# Patient Record
Sex: FEMALE | Race: WHITE | Employment: OTHER | URBAN - METROPOLITAN AREA
[De-identification: names, ages, dates, MRNs, and addresses within clinical notes are randomized per-mention and may not be internally consistent; named-entity substitution may affect disease eponyms.]

---

## 2017-01-23 ENCOUNTER — TELEPHONE (OUTPATIENT)
Dept: FAMILY MEDICINE CLINIC | Age: 77
End: 2017-01-23

## 2017-03-01 ENCOUNTER — TELEPHONE (OUTPATIENT)
Dept: FAMILY MEDICINE CLINIC | Age: 77
End: 2017-03-01

## 2017-03-10 ENCOUNTER — TELEPHONE (OUTPATIENT)
Dept: FAMILY MEDICINE CLINIC | Age: 77
End: 2017-03-10

## 2017-03-10 ENCOUNTER — OFFICE VISIT (OUTPATIENT)
Dept: FAMILY MEDICINE CLINIC | Age: 77
End: 2017-03-10

## 2017-03-10 VITALS
HEART RATE: 56 BPM | WEIGHT: 194 LBS | HEIGHT: 67 IN | SYSTOLIC BLOOD PRESSURE: 170 MMHG | DIASTOLIC BLOOD PRESSURE: 64 MMHG | BODY MASS INDEX: 30.45 KG/M2

## 2017-03-10 DIAGNOSIS — Z01.818 PRE-OP EXAM: ICD-10-CM

## 2017-03-10 DIAGNOSIS — R07.9 CHEST PAIN, UNSPECIFIED TYPE: ICD-10-CM

## 2017-03-10 DIAGNOSIS — E78.2 MIXED HYPERLIPIDEMIA: ICD-10-CM

## 2017-03-10 DIAGNOSIS — M48.061 SPINAL STENOSIS OF LUMBAR REGION: Primary | ICD-10-CM

## 2017-03-10 DIAGNOSIS — I10 ESSENTIAL HYPERTENSION, BENIGN: ICD-10-CM

## 2017-03-10 LAB
A/G RATIO: 1.6 (ref 1.1–2.2)
ALBUMIN SERPL-MCNC: 4.2 G/DL (ref 3.4–5)
ALP BLD-CCNC: 89 U/L (ref 40–129)
ALT SERPL-CCNC: 13 U/L (ref 10–40)
ANION GAP SERPL CALCULATED.3IONS-SCNC: 14 MMOL/L (ref 3–16)
AST SERPL-CCNC: 16 U/L (ref 15–37)
BASOPHILS ABSOLUTE: 0.1 K/UL (ref 0–0.2)
BASOPHILS RELATIVE PERCENT: 2.3 %
BILIRUB SERPL-MCNC: 0.3 MG/DL (ref 0–1)
BUN BLDV-MCNC: 16 MG/DL (ref 7–20)
CALCIUM SERPL-MCNC: 9.3 MG/DL (ref 8.3–10.6)
CHLORIDE BLD-SCNC: 100 MMOL/L (ref 99–110)
CHOLESTEROL, TOTAL: 211 MG/DL (ref 0–199)
CO2: 25 MMOL/L (ref 21–32)
CREAT SERPL-MCNC: 0.8 MG/DL (ref 0.6–1.2)
EOSINOPHILS ABSOLUTE: 0.2 K/UL (ref 0–0.6)
EOSINOPHILS RELATIVE PERCENT: 3.3 %
GFR AFRICAN AMERICAN: >60
GFR NON-AFRICAN AMERICAN: >60
GLOBULIN: 2.7 G/DL
GLUCOSE BLD-MCNC: 122 MG/DL (ref 70–99)
HCT VFR BLD CALC: 38.8 % (ref 36–48)
HDLC SERPL-MCNC: 51 MG/DL (ref 40–60)
HEMOGLOBIN: 12.8 G/DL (ref 12–16)
LDL CHOLESTEROL CALCULATED: 126 MG/DL
LYMPHOCYTES ABSOLUTE: 2 K/UL (ref 1–5.1)
LYMPHOCYTES RELATIVE PERCENT: 40.3 %
MCH RBC QN AUTO: 29.9 PG (ref 26–34)
MCHC RBC AUTO-ENTMCNC: 33 G/DL (ref 31–36)
MCV RBC AUTO: 90.5 FL (ref 80–100)
MONOCYTES ABSOLUTE: 0.4 K/UL (ref 0–1.3)
MONOCYTES RELATIVE PERCENT: 8.7 %
NEUTROPHILS ABSOLUTE: 2.2 K/UL (ref 1.7–7.7)
NEUTROPHILS RELATIVE PERCENT: 45.4 %
PDW BLD-RTO: 13.8 % (ref 12.4–15.4)
PLATELET # BLD: 290 K/UL (ref 135–450)
PMV BLD AUTO: 8.3 FL (ref 5–10.5)
POTASSIUM SERPL-SCNC: 4.6 MMOL/L (ref 3.5–5.1)
RBC # BLD: 4.29 M/UL (ref 4–5.2)
SODIUM BLD-SCNC: 139 MMOL/L (ref 136–145)
TOTAL PROTEIN: 6.9 G/DL (ref 6.4–8.2)
TRIGL SERPL-MCNC: 169 MG/DL (ref 0–150)
VLDLC SERPL CALC-MCNC: 34 MG/DL
WBC # BLD: 4.9 K/UL (ref 4–11)

## 2017-03-10 PROCEDURE — 99215 OFFICE O/P EST HI 40 MIN: CPT | Performed by: FAMILY MEDICINE

## 2017-03-10 PROCEDURE — 80053 COMPREHEN METABOLIC PANEL: CPT | Performed by: FAMILY MEDICINE

## 2017-03-10 PROCEDURE — 85025 COMPLETE CBC W/AUTO DIFF WBC: CPT | Performed by: FAMILY MEDICINE

## 2017-03-10 PROCEDURE — 93000 ELECTROCARDIOGRAM COMPLETE: CPT | Performed by: FAMILY MEDICINE

## 2017-03-10 PROCEDURE — 80061 LIPID PANEL: CPT | Performed by: FAMILY MEDICINE

## 2017-04-06 DIAGNOSIS — I10 ESSENTIAL HYPERTENSION, BENIGN: ICD-10-CM

## 2017-04-07 RX ORDER — METOPROLOL SUCCINATE 50 MG/1
TABLET, EXTENDED RELEASE ORAL
Qty: 90 TABLET | Refills: 3 | Status: SHIPPED | OUTPATIENT
Start: 2017-04-07 | End: 2018-04-23 | Stop reason: SDUPTHER

## 2017-04-07 RX ORDER — AMLODIPINE BESYLATE 5 MG/1
TABLET ORAL
Qty: 90 TABLET | Refills: 3 | Status: SHIPPED | OUTPATIENT
Start: 2017-04-07 | End: 2018-04-23 | Stop reason: SDUPTHER

## 2018-04-17 ENCOUNTER — OFFICE VISIT (OUTPATIENT)
Dept: FAMILY MEDICINE CLINIC | Age: 78
End: 2018-04-17

## 2018-04-17 VITALS
RESPIRATION RATE: 16 BRPM | WEIGHT: 198 LBS | HEIGHT: 67 IN | HEART RATE: 65 BPM | SYSTOLIC BLOOD PRESSURE: 150 MMHG | DIASTOLIC BLOOD PRESSURE: 76 MMHG | OXYGEN SATURATION: 94 % | BODY MASS INDEX: 31.08 KG/M2

## 2018-04-17 DIAGNOSIS — M85.80 OSTEOPENIA, UNSPECIFIED LOCATION: ICD-10-CM
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
## 2018-04-17 DIAGNOSIS — M54.6 THORACIC SPINE PAIN: Primary | ICD-10-CM

## 2018-04-17 PROCEDURE — 99213 OFFICE O/P EST LOW 20 MIN: CPT | Performed by: INTERNAL MEDICINE

## 2018-04-17 ASSESSMENT — PATIENT HEALTH QUESTIONNAIRE - PHQ9
SUM OF ALL RESPONSES TO PHQ9 QUESTIONS 1 & 2: 0
1. LITTLE INTEREST OR PLEASURE IN DOING THINGS: 0
SUM OF ALL RESPONSES TO PHQ QUESTIONS 1-9: 0
2. FEELING DOWN, DEPRESSED OR HOPELESS: 0

## 2018-04-23 ENCOUNTER — HOSPITAL ENCOUNTER (OUTPATIENT)
Dept: MAMMOGRAPHY | Age: 78
Discharge: HOME | End: 2018-04-23
Attending: FAMILY MEDICINE | Admitting: FAMILY MEDICINE

## 2018-04-23 DIAGNOSIS — I10 ESSENTIAL HYPERTENSION, BENIGN: ICD-10-CM

## 2018-04-23 DIAGNOSIS — Z12.31 VISIT FOR SCREENING MAMMOGRAM: ICD-10-CM

## 2018-04-24 ENCOUNTER — TELEPHONE (OUTPATIENT)
Dept: FAMILY MEDICINE CLINIC | Age: 78
End: 2018-04-24

## 2018-04-24 DIAGNOSIS — R92.8 ABNORMAL MAMMOGRAM: Primary | ICD-10-CM

## 2018-04-24 RX ORDER — AMLODIPINE BESYLATE 5 MG/1
TABLET ORAL
Qty: 90 TABLET | Refills: 3 | Status: SHIPPED | OUTPATIENT
Start: 2018-04-24 | End: 2019-08-09 | Stop reason: SDUPTHER

## 2018-04-24 RX ORDER — METOPROLOL SUCCINATE 50 MG/1
TABLET, EXTENDED RELEASE ORAL
Qty: 90 TABLET | Refills: 3 | Status: SHIPPED | OUTPATIENT
Start: 2018-04-24 | End: 2019-08-09 | Stop reason: SDUPTHER

## 2018-04-25 ENCOUNTER — HOSPITAL ENCOUNTER (OUTPATIENT)
Dept: OTHER | Age: 78
Discharge: HOME | End: 2018-04-30
Attending: INTERNAL MEDICINE | Admitting: INTERNAL MEDICINE

## 2018-04-25 ASSESSMENT — PAIN DESCRIPTION - LOCATION: LOCATION: BACK

## 2018-04-25 ASSESSMENT — PAIN DESCRIPTION - PAIN TYPE: TYPE: ACUTE PAIN

## 2018-04-25 ASSESSMENT — PAIN DESCRIPTION - ORIENTATION: ORIENTATION: RIGHT

## 2018-04-25 ASSESSMENT — PAIN DESCRIPTION - PROGRESSION: CLINICAL_PROGRESSION: GRADUALLY IMPROVING

## 2018-04-25 ASSESSMENT — PAIN SCALES - GENERAL: PAINLEVEL_OUTOF10: 10

## 2018-04-25 ASSESSMENT — PAIN DESCRIPTION - FREQUENCY: FREQUENCY: CONTINUOUS

## 2018-04-25 ASSESSMENT — PAIN DESCRIPTION - ONSET: ONSET: AWAKENED FROM SLEEP

## 2018-04-25 ASSESSMENT — PAIN DESCRIPTION - DESCRIPTORS: DESCRIPTORS: CONSTANT

## 2018-04-26 ENCOUNTER — TELEPHONE (OUTPATIENT)
Dept: FAMILY MEDICINE CLINIC | Age: 78
End: 2018-04-26

## 2018-04-27 ENCOUNTER — HOSPITAL ENCOUNTER (OUTPATIENT)
Dept: ULTRASOUND IMAGING | Age: 78
Discharge: HOME | End: 2018-04-27
Attending: FAMILY MEDICINE | Admitting: FAMILY MEDICINE

## 2018-04-27 DIAGNOSIS — R92.8 OTHER ABNORMAL AND INCONCLUSIVE FINDINGS ON DIAGNOSTIC IMAGING OF BREAST: ICD-10-CM

## 2018-04-27 DIAGNOSIS — R92.8 ABNORMAL MAMMOGRAM: ICD-10-CM

## 2018-05-01 ENCOUNTER — HOSPITAL ENCOUNTER (OUTPATIENT)
Dept: OTHER | Age: 78
Discharge: HOME | End: 2018-05-31
Attending: INTERNAL MEDICINE | Admitting: INTERNAL MEDICINE

## 2019-01-04 ENCOUNTER — TELEPHONE (OUTPATIENT)
Dept: FAMILY MEDICINE CLINIC | Age: 79
End: 2019-01-04

## 2019-04-02 PROBLEM — R73.03 PREDIABETES: Status: ACTIVE | Noted: 2019-04-02

## 2019-04-03 ENCOUNTER — OFFICE VISIT (OUTPATIENT)
Dept: FAMILY MEDICINE CLINIC | Age: 79
End: 2019-04-03
Payer: COMMERCIAL

## 2019-04-03 VITALS
HEART RATE: 62 BPM | OXYGEN SATURATION: 95 % | WEIGHT: 190 LBS | SYSTOLIC BLOOD PRESSURE: 147 MMHG | BODY MASS INDEX: 29.82 KG/M2 | RESPIRATION RATE: 16 BRPM | HEIGHT: 67 IN | DIASTOLIC BLOOD PRESSURE: 80 MMHG

## 2019-04-03 DIAGNOSIS — N60.09 CYST OF BREAST, UNSPECIFIED LATERALITY: ICD-10-CM

## 2019-04-03 DIAGNOSIS — M85.80 OSTEOPENIA, UNSPECIFIED LOCATION: ICD-10-CM

## 2019-04-03 DIAGNOSIS — N39.46 MIXED STRESS AND URGE URINARY INCONTINENCE: ICD-10-CM

## 2019-04-03 DIAGNOSIS — M19.91 PRIMARY OSTEOARTHRITIS, UNSPECIFIED SITE: ICD-10-CM

## 2019-04-03 DIAGNOSIS — K21.9 GASTROESOPHAGEAL REFLUX DISEASE WITHOUT ESOPHAGITIS: ICD-10-CM

## 2019-04-03 DIAGNOSIS — Z87.01 HISTORY OF PNEUMONIA: ICD-10-CM

## 2019-04-03 DIAGNOSIS — R73.03 PREDIABETES: ICD-10-CM

## 2019-04-03 DIAGNOSIS — I10 ESSENTIAL HYPERTENSION, BENIGN: Primary | ICD-10-CM

## 2019-04-03 DIAGNOSIS — M48.061 SPINAL STENOSIS OF LUMBAR REGION, UNSPECIFIED WHETHER NEUROGENIC CLAUDICATION PRESENT: ICD-10-CM

## 2019-04-03 DIAGNOSIS — E78.2 MIXED HYPERLIPIDEMIA: ICD-10-CM

## 2019-04-03 LAB
ALBUMIN SERPL-MCNC: 4.6 G/DL (ref 3.4–5)
ALP BLD-CCNC: 94 U/L (ref 40–129)
ALT SERPL-CCNC: 10 U/L (ref 10–40)
ANION GAP SERPL CALCULATED.3IONS-SCNC: 12 MMOL/L (ref 3–16)
AST SERPL-CCNC: 14 U/L (ref 15–37)
BILIRUB SERPL-MCNC: <0.2 MG/DL (ref 0–1)
BILIRUBIN DIRECT: <0.2 MG/DL (ref 0–0.3)
BILIRUBIN URINE: NEGATIVE
BILIRUBIN, INDIRECT: ABNORMAL MG/DL (ref 0–1)
BLOOD, URINE: NEGATIVE
BUN BLDV-MCNC: 24 MG/DL (ref 7–20)
CALCIUM SERPL-MCNC: 9.7 MG/DL (ref 8.3–10.6)
CHLORIDE BLD-SCNC: 104 MMOL/L (ref 99–110)
CHOLESTEROL, TOTAL: 227 MG/DL (ref 0–199)
CLARITY: CLEAR
CO2: 26 MMOL/L (ref 21–32)
COLOR: YELLOW
CREAT SERPL-MCNC: 0.7 MG/DL (ref 0.6–1.2)
GFR AFRICAN AMERICAN: >60
GFR NON-AFRICAN AMERICAN: >60
GLUCOSE BLD-MCNC: 98 MG/DL (ref 70–99)
GLUCOSE URINE: NEGATIVE MG/DL
HDLC SERPL-MCNC: 49 MG/DL (ref 40–60)
KETONES, URINE: NEGATIVE MG/DL
LDL CHOLESTEROL CALCULATED: 121 MG/DL
LEUKOCYTE ESTERASE, URINE: NEGATIVE
MICROSCOPIC EXAMINATION: NORMAL
NITRITE, URINE: NEGATIVE
PH UA: 5.5 (ref 5–8)
POTASSIUM SERPL-SCNC: 4.9 MMOL/L (ref 3.5–5.1)
PROTEIN UA: NEGATIVE MG/DL
SODIUM BLD-SCNC: 142 MMOL/L (ref 136–145)
SPECIFIC GRAVITY UA: 1.02 (ref 1–1.03)
TOTAL PROTEIN: 7.4 G/DL (ref 6.4–8.2)
TRIGL SERPL-MCNC: 287 MG/DL (ref 0–150)
URINE REFLEX TO CULTURE: NORMAL
URINE TYPE: NORMAL
UROBILINOGEN, URINE: 0.2 E.U./DL
VITAMIN D 25-HYDROXY: 27.4 NG/ML
VLDLC SERPL CALC-MCNC: 57 MG/DL

## 2019-04-03 PROCEDURE — 99214 OFFICE O/P EST MOD 30 MIN: CPT | Performed by: INTERNAL MEDICINE

## 2019-04-03 PROCEDURE — 81003 URINALYSIS AUTO W/O SCOPE: CPT | Performed by: INTERNAL MEDICINE

## 2019-04-03 ASSESSMENT — PATIENT HEALTH QUESTIONNAIRE - PHQ9
2. FEELING DOWN, DEPRESSED OR HOPELESS: 0
SUM OF ALL RESPONSES TO PHQ9 QUESTIONS 1 & 2: 0
SUM OF ALL RESPONSES TO PHQ QUESTIONS 1-9: 0
1. LITTLE INTEREST OR PLEASURE IN DOING THINGS: 0
SUM OF ALL RESPONSES TO PHQ QUESTIONS 1-9: 0

## 2019-04-03 NOTE — PROGRESS NOTES
HPI: Mauro Velasco presents to establish care. Chronic health issues include obesity, mixed urinary incontinence, osteoarthritis, lumbar laminectomy, hypertension, prediabetes, hyperlipidemia. History mixed urinary incontinence. Wet constantly. Wears a pad. States this occurred after having back surgery. Kegals have not been helpful. Is losing weight. Exercising. No injury since. No stool leakage. Positive prediabetes. No recurrent urinary tract infections. BMI 29 rare GE reflux. Positive prediabetes. Elevated cholesterol. Urinary incontinence. Has lost weight with exercise and diet. Continue to do so. Lumbar laminectomy with lower extremity paresthesias and spinal stimulator. Uses no pain medication. Other joint issues persist. Uses no medications. This seems to be stable. Hypertension. Normal thallium ejection fraction 65% . Mildly elevated LDL. Positive prediabetes secondary smoke. No chest pain with exertion or exercise. occ palpitations       , no FH ovarian, breast. No abnormal Pap smears amenorrheic. Urinary incontinence. PMH    Total hip replacement x 2\     Lumbar laminectomy         pool 60 min twice weekly.  poor health. No tobacco. 3-4 yearly. Son   from fall at [facility]. 2 grandchildren     FH: +heart failure, prostate cancer, heart disease, OA          - ovarin, breast cancer, mental illness    Review of systems: Up-to-date on eye exams. Delinquent dental exams. Follow greater than a year ago. No recurrence. Occasional wheeze. No breathing issues today. Denies any chest pain palpitations or increased lower extremity edema. Rare GE reflux. No bloody stools or change in stools. Told she needs no more colonoscopies. Urinary incontinence. No vaginal complaints. Contact dermatitis perineum. Osteoarthritis.  Skin cancers with routine follow-up with dermatology    Constitutional, ent, CV, respiratory, GI, , joint, skin, allergic and psychiatric ROS Physical Exam     NAD alert and cooperative  HEENT: Fair dentition. No proptosis. Pink conjunctiva. Good upstroke of the carotids. Lungs are clear. Mild kyphosis. No wheezes rales or rhonchi. Good inspiration. Cardiovascular exam regular rate and rhythm without any murmur click. No  dominant masses. Nodularity on right. Positive obesity no hepatosplenomegaly or epigastric tenderness. Good range of motion to hips. Crepitus of the knees. Trace lower extremity edema varicosities. Crepitus of the knees. Multiple hyperpigmented nevi. None suspicious borders.       Chemistry        Component Value Date/Time     03/10/2017 1055    K 4.6 03/10/2017 1055     03/10/2017 1055    CO2 25 03/10/2017 1055    BUN 16 03/10/2017 1055    CREATININE 0.8 03/10/2017 1055        Component Value Date/Time    CALCIUM 9.3 03/10/2017 1055    ALKPHOS 89 03/10/2017 1055    AST 16 03/10/2017 1055    ALT 13 03/10/2017 1055    BILITOT 0.3 03/10/2017 1055    BILITOT 0.5 11/16/2011            Lab Results   Component Value Date    WBC 4.9 03/10/2017    HGB 12.8 03/10/2017    HCT 38.8 03/10/2017    MCV 90.5 03/10/2017     03/10/2017     Lab Results   Component Value Date    LABA1C 6.0 04/05/2012     Lab Results   Component Value Date    .5 04/05/2012     Lab Results   Component Value Date    LABA1C 6.0 04/05/2012     No components found for: CHLPL  Lab Results   Component Value Date    TRIG 169 (H) 03/10/2017    TRIG 233 (H) 09/16/2016    TRIG 271 (H) 08/11/2015     Lab Results   Component Value Date    HDL 51 03/10/2017    HDL 51 09/16/2016    HDL 47 08/11/2015     Lab Results   Component Value Date    LDLCALC 126 (H) 03/10/2017    LDLCALC 140 (H) 09/16/2016    LDLCALC 116 (H) 08/11/2015     Lab Results   Component Value Date    LABVLDL 34 03/10/2017    LABVLDL 47 09/16/2016    LABVLDL 54 08/11/2015       Old labs and records reviewed or requested  Discussed past lab and studies with patient      Diagnosis Orders 1. Essential hypertension, benign  Basic Metabolic Panel   2. Spinal stenosis of lumbar region, unspecified whether neurogenic claudication present     3. Primary osteoarthritis, unspecified site     4. Cyst of breast, unspecified laterality     5. Mixed hyperlipidemia  Lipid Panel    Hepatic Function Panel   6. History of pneumonia     7. Gastroesophageal reflux disease without esophagitis     8. Osteopenia, unspecified location  Vitamin D 25 Hydroxy   9. Prediabetes  Hemoglobin A1C   10. At high risk for falls     11. Mixed stress and urge urinary incontinence  URINE RT REFLEX TO CULTURE    Referral To [facility]        hypertension good control at home. Normal thallium. Spinal stenosis with osteoarthritis continue on with her water exercises    Right breast cyst. Will call for a repeat mammogram and ultrasound. Hyperlipidemia. Fair control. We'll recheck in today with liver function test    Intermittent wheeze. Normal chest x-ray 2015. Secondary tobacco. No current issues. GE reflux without esophagitis. May use Tums on a when necessary basis. Osteopenia on bone density. Check vitamin D. Stress and urge incontinence. Was given bladder training.  Referral to the pelvic floor clinic

## 2019-04-03 NOTE — PATIENT INSTRUCTIONS
Continue nice weight loss and exercise  rec pelvic floor center for bladder  Recheck BP with our and your cuff 2 weeks  Continue current prescription medication. Can stop fish oil and b 12  Dental exam  Schedule mammogram and ultrasound    Patient Education        Bladder Training: Care Instructions  Your Care Instructions    Bladder training is used to treat urge incontinence and stress incontinence. Urge incontinence means that the need to urinate comes on so fast that you can't get to a toilet in time. Stress incontinence means that you leak urine because of pressure on your bladder. For example, it may happen when you laugh, cough, or lift something heavy. Bladder training can increase how long you can wait before you have to urinate. It can also help your bladder hold more urine. And it can give you better control over the urge to urinate. It is important to remember that bladder training takes a few weeks to a few months to make a difference. You may not see results right away, but don't give up. Follow-up care is a key part of your treatment and safety. Be sure to make and go to all appointments, and call your doctor if you are having problems. It's also a good idea to know your test results and keep a list of the medicines you take. How can you care for yourself at home? Work with your doctor to come up with a bladder training program that is right for you. You may use one or more of the following methods. Delayed urination  · In the beginning, try to keep from urinating for 5 minutes after you first feel the need to go. · While you wait, take deep, slow breaths to relax. Kegel exercises can also help you delay the need to go to the bathroom. · After some practice, when you can easily wait 5 minutes to urinate, try to wait 10 minutes before you urinate. · Slowly increase the waiting period until you are able to control when you have to urinate.   Scheduled urination  · Empty your bladder when you first wake up in the morning. · Schedule times throughout the day when you will urinate. · Start by going to the bathroom every hour, even if you don't need to go. · Slowly increase the time between trips to the bathroom. · When you have found a schedule that works well for you, keep doing it. · If you wake up during the night and have to urinate, do it. Apply your schedule to waking hours only. Kegel exercises  These tighten and strengthen pelvic muscles, which can help you control the flow of urine. To do Kegel exercises:  · Squeeze the same muscles you would use to stop your urine. Your belly and thighs should not move. · Hold the squeeze for 3 seconds, and then relax for 3 seconds. · Start with 3 seconds. Then add 1 second each week until you are able to squeeze for 10 seconds. · Repeat the exercise 10 to 15 times a session. Do three or more sessions a day. When should you call for help? Watch closely for changes in your health, and be sure to contact your doctor if:    · Your incontinence is getting worse.     · You do not get better as expected. Where can you learn more? Go to [email or web address] org and sign in to your [employer] account. Enter D411 in the Wellsphere box to learn more about \"Bladder Training: Care Instructions. \"     If you do not have an account, please click on the \"Sign Up Now\" link. Current as of: March 20, 2018  Content Version: 11.9  © 4395-2223 [employer]. Care instructions adapted under license by [facility] ([facility]). If you have questions about a medical condition or this instruction, always ask your healthcare professional. [place] [address] any warranty or liability for your use of this information. Patient Education        Stress Incontinence in Women: Care Instructions  Your Care Instructions  Stress incontinence is the accidental release of urine caused by activities that put pressure on your bladder.  It may happen most often when you sneeze, cough, laugh, jog, or lift something heavy. This condition does not cause major health problems, but it can be embarrassing and interfere with your life. Treatment can cure or improve your symptoms. Follow-up care is a key part of your treatment and safety. Be sure to make and go to all appointments, and call your doctor if you are having problems. It's also a good idea to know your test results and keep a list of the medicines you take. How can you care for yourself at home? · Take your medicines exactly as prescribed. Call your doctor if you think you are having a problem with your medicine. · Limit caffeine and alcohol. They make you urinate more. · Do pelvic floor (Kegel) exercises, which tighten and strengthen pelvic muscles. To do Kegel exercises:  ? Squeeze the same muscles you would use to stop your urine. Your belly and thighs should not move. ? Hold the squeeze for 3 seconds, and then relax for 3 seconds. ? Start with 3 seconds. Then add 1 second each week until you are able to squeeze for 10 seconds. ? Repeat the exercise 10 to 15 times a session. Do three or more sessions a day. · Try wearing pads that absorb leaks. Or you may want to try to prevent leaks with a product like Poise Impressa, which you insert like a tampon. · Keep skin in the genital area dry. Petroleum jelly (like Vaseline) spread on the area may help protect your skin. When should you call for help? Call your doctor now or seek immediate medical care if:    · You have new urinary symptoms. These may include leaking urine, having pain when urinating, or feeling like you need to urinate often.    Watch closely for changes in your health, and be sure to contact your doctor if:    · You do not get better as expected. Where can you learn more? Go to https://matilda.Diagnoplex. org and sign in to your BuzzDash account.  Enter P849 in the Numerex box to learn more about \"Stress Incontinence in Women: Care Instructions. \"     If you do not have an account, please click on the \"Sign Up Now\" link. Current as of: May 14, 2018  Content Version: 11.9  © 4551-6919 [employer]. Care instructions adapted under license by [facility] ([facility]). If you have questions about a medical condition or this instruction, always ask your healthcare professional. [address] any warranty or liability for your use of this information.

## 2019-04-04 LAB
ESTIMATED AVERAGE GLUCOSE: 137 MG/DL
HBA1C MFR BLD: 6.4 %

## 2019-05-10 ENCOUNTER — HOSPITAL ENCOUNTER (OUTPATIENT)
Dept: WOMENS IMAGING | Age: 79
Discharge: HOME | End: 2019-05-10
Payer: MEDICARE

## 2019-05-10 DIAGNOSIS — Z12.31 VISIT FOR SCREENING MAMMOGRAM: ICD-10-CM

## 2019-05-10 DIAGNOSIS — R92.8 ABNORMAL MAMMOGRAM: Primary | ICD-10-CM

## 2019-05-10 PROCEDURE — 77067 SCR MAMMO BI INCL CAD: CPT

## 2019-05-17 ENCOUNTER — HOSPITAL ENCOUNTER (OUTPATIENT)
Dept: WOMENS IMAGING | Age: 79
Discharge: HOME | End: 2019-05-17
Payer: MEDICARE

## 2019-05-17 ENCOUNTER — HOSPITAL ENCOUNTER (OUTPATIENT)
Dept: ULTRASOUND IMAGING | Age: 79
Discharge: HOME | End: 2019-05-17
Payer: MEDICARE

## 2019-05-17 ENCOUNTER — ANCILLARY ORDERS (OUTPATIENT)
Dept: FAMILY MEDICINE CLINIC | Age: 79
End: 2019-05-17

## 2019-05-17 DIAGNOSIS — R92.8 ABNORMAL MAMMOGRAM: ICD-10-CM

## 2019-05-17 PROCEDURE — 76642 ULTRASOUND BREAST LIMITED: CPT

## 2019-05-17 PROCEDURE — 77065 DX MAMMO INCL CAD UNI: CPT

## 2019-06-10 ENCOUNTER — CASE MANAGEMENT (OUTPATIENT)
Dept: WOMENS IMAGING | Age: 79
End: 2019-06-10

## 2019-06-10 NOTE — PROGRESS NOTES
Patient was scheduled for cyst aspiration on 6/3/19.  Patient did not show up nor call to cancel/reschedule

## 2019-07-05 ENCOUNTER — CASE MANAGEMENT (OUTPATIENT)
Dept: WOMENS IMAGING | Age: 79
End: 2019-07-05

## 2019-07-08 ENCOUNTER — HOSPITAL ENCOUNTER (OUTPATIENT)
Dept: WOMENS IMAGING | Age: 79
Discharge: HOME | End: 2019-07-08
Payer: MEDICARE

## 2019-07-08 ENCOUNTER — HOSPITAL ENCOUNTER (OUTPATIENT)
Dept: ULTRASOUND IMAGING | Age: 79
Discharge: HOME | End: 2019-07-08
Payer: MEDICARE

## 2019-07-08 VITALS — SYSTOLIC BLOOD PRESSURE: 139 MMHG | HEART RATE: 64 BPM | OXYGEN SATURATION: 98 % | DIASTOLIC BLOOD PRESSURE: 43 MMHG

## 2019-07-08 DIAGNOSIS — R92.8 ABNORMAL MAMMOGRAM: ICD-10-CM

## 2019-07-08 PROCEDURE — 77065 DX MAMMO INCL CAD UNI: CPT

## 2019-07-08 PROCEDURE — 2720000010 US BREAST BIOPSY W LOC DEVICE 1ST LESION RIGHT

## 2019-07-08 PROCEDURE — 88305 TISSUE EXAM BY PATHOLOGIST: CPT

## 2019-07-08 ASSESSMENT — PAIN - FUNCTIONAL ASSESSMENT
PAIN_FUNCTIONAL_ASSESSMENT: 0-10
PAIN_FUNCTIONAL_ASSESSMENT: 0-10

## 2019-07-09 ENCOUNTER — CASE MANAGEMENT (OUTPATIENT)
Dept: WOMENS IMAGING | Age: 79
End: 2019-07-09

## 2019-08-09 DIAGNOSIS — I10 ESSENTIAL HYPERTENSION, BENIGN: ICD-10-CM

## 2019-08-10 RX ORDER — METOPROLOL SUCCINATE 50 MG/1
TABLET, EXTENDED RELEASE ORAL
Qty: 90 TABLET | Refills: 3 | Status: SHIPPED | OUTPATIENT
Start: 2019-08-10 | End: 2020-02-24 | Stop reason: SDUPTHER

## 2019-08-10 RX ORDER — AMLODIPINE BESYLATE 5 MG/1
TABLET ORAL
Qty: 90 TABLET | Refills: 3 | Status: SHIPPED | OUTPATIENT
Start: 2019-08-10 | End: 2020-05-27

## 2020-01-08 ENCOUNTER — TELEPHONE (OUTPATIENT)
Dept: FAMILY MEDICINE CLINIC | Age: 80
End: 2020-01-08

## 2020-01-08 NOTE — TELEPHONE ENCOUNTER
PT would like a call back regarding getting a referral to have a mammogram completed at [facility].

## 2020-01-15 ENCOUNTER — TELEPHONE (OUTPATIENT)
Dept: FAMILY MEDICINE CLINIC | Age: 80
End: 2020-01-15

## 2020-01-22 ENCOUNTER — OFFICE VISIT (OUTPATIENT)
Dept: FAMILY MEDICINE CLINIC | Age: 80
End: 2020-01-22
Payer: MEDICARE

## 2020-01-22 ENCOUNTER — HOSPITAL ENCOUNTER (OUTPATIENT)
Dept: WOMENS IMAGING | Age: 80
Discharge: HOME | End: 2020-01-22
Payer: MEDICARE

## 2020-01-22 ENCOUNTER — HOSPITAL ENCOUNTER (OUTPATIENT)
Dept: ULTRASOUND IMAGING | Age: 80
Discharge: HOME | End: 2020-01-22
Payer: MEDICARE

## 2020-01-22 VITALS
WEIGHT: 191 LBS | OXYGEN SATURATION: 94 % | HEART RATE: 52 BPM | DIASTOLIC BLOOD PRESSURE: 72 MMHG | HEIGHT: 67 IN | SYSTOLIC BLOOD PRESSURE: 148 MMHG | RESPIRATION RATE: 16 BRPM | BODY MASS INDEX: 29.98 KG/M2

## 2020-01-22 LAB
A/G RATIO: 1.6 (ref 1.1–2.2)
ALBUMIN SERPL-MCNC: 4.2 G/DL (ref 3.4–5)
ALP BLD-CCNC: 81 U/L (ref 40–129)
ALT SERPL-CCNC: 11 U/L (ref 10–40)
ANION GAP SERPL CALCULATED.3IONS-SCNC: 15 MMOL/L (ref 3–16)
AST SERPL-CCNC: 15 U/L (ref 15–37)
BILIRUB SERPL-MCNC: <0.2 MG/DL (ref 0–1)
BUN BLDV-MCNC: 18 MG/DL (ref 7–20)
CALCIUM SERPL-MCNC: 9.4 MG/DL (ref 8.3–10.6)
CHLORIDE BLD-SCNC: 98 MMOL/L (ref 99–110)
CHOLESTEROL, TOTAL: 199 MG/DL (ref 0–199)
CO2: 24 MMOL/L (ref 21–32)
CREAT SERPL-MCNC: 0.8 MG/DL (ref 0.6–1.2)
GFR AFRICAN AMERICAN: >60
GFR NON-AFRICAN AMERICAN: >60
GLOBULIN: 2.6 G/DL
GLUCOSE BLD-MCNC: 160 MG/DL (ref 70–99)
HBA1C MFR BLD: 6.3 %
HCT VFR BLD CALC: 40 % (ref 36–48)
HDLC SERPL-MCNC: 49 MG/DL (ref 40–60)
HEMOGLOBIN: 13.2 G/DL (ref 12–16)
LDL CHOLESTEROL CALCULATED: 91 MG/DL
MCH RBC QN AUTO: 30.2 PG (ref 26–34)
MCHC RBC AUTO-ENTMCNC: 33.1 G/DL (ref 31–36)
MCV RBC AUTO: 91.5 FL (ref 80–100)
PDW BLD-RTO: 13.7 % (ref 12.4–15.4)
PLATELET # BLD: 280 K/UL (ref 135–450)
PMV BLD AUTO: 8.6 FL (ref 5–10.5)
POTASSIUM SERPL-SCNC: 4.2 MMOL/L (ref 3.5–5.1)
RBC # BLD: 4.37 M/UL (ref 4–5.2)
SODIUM BLD-SCNC: 137 MMOL/L (ref 136–145)
TOTAL PROTEIN: 6.8 G/DL (ref 6.4–8.2)
TRIGL SERPL-MCNC: 295 MG/DL (ref 0–150)
VITAMIN D 25-HYDROXY: 30.6 NG/ML
VLDLC SERPL CALC-MCNC: 59 MG/DL
WBC # BLD: 6.2 K/UL (ref 4–11)

## 2020-01-22 PROCEDURE — G0008 ADMIN INFLUENZA VIRUS VAC: HCPCS | Performed by: INTERNAL MEDICINE

## 2020-01-22 PROCEDURE — 90653 IIV ADJUVANT VACCINE IM: CPT | Performed by: INTERNAL MEDICINE

## 2020-01-22 PROCEDURE — 99397 PER PM REEVAL EST PAT 65+ YR: CPT | Performed by: INTERNAL MEDICINE

## 2020-01-22 PROCEDURE — 99213 OFFICE O/P EST LOW 20 MIN: CPT | Performed by: INTERNAL MEDICINE

## 2020-01-22 PROCEDURE — 76642 ULTRASOUND BREAST LIMITED: CPT

## 2020-01-22 PROCEDURE — G8482 FLU IMMUNIZE ORDER/ADMIN: HCPCS | Performed by: INTERNAL MEDICINE

## 2020-01-22 PROCEDURE — 93000 ELECTROCARDIOGRAM COMPLETE: CPT | Performed by: INTERNAL MEDICINE

## 2020-01-22 PROCEDURE — 83036 HEMOGLOBIN GLYCOSYLATED A1C: CPT | Performed by: INTERNAL MEDICINE

## 2020-01-22 PROCEDURE — G0279 TOMOSYNTHESIS, MAMMO: HCPCS

## 2020-01-22 RX ORDER — OMEPRAZOLE 20 MG/1
20 CAPSULE, DELAYED RELEASE ORAL
Qty: 60 CAPSULE | Refills: 0 | Status: SHIPPED | OUTPATIENT
Start: 2020-01-22 | End: 2021-03-29

## 2020-01-22 ASSESSMENT — PATIENT HEALTH QUESTIONNAIRE - PHQ9
SUM OF ALL RESPONSES TO PHQ QUESTIONS 1-9: 0
1. LITTLE INTEREST OR PLEASURE IN DOING THINGS: 0
SUM OF ALL RESPONSES TO PHQ QUESTIONS 1-9: 0
SUM OF ALL RESPONSES TO PHQ9 QUESTIONS 1 & 2: 0
2. FEELING DOWN, DEPRESSED OR HOPELESS: 0

## 2020-01-22 NOTE — PROGRESS NOTES
chest pain with exertion or exercise. occ palpitations       , no FH ovarian, breast. No abnormal Pap smears amenorrheic. Urinary incontinence. Sexually active.     PMH    Total hip replacement x 2\     Lumbar laminectomy            pool 60 min twice weekly.  poor health positive dementia. Not aggressive. Forgets how to make coffee. Has been putting items in the wrong places. Has seen neurology. Is on Aricept but not Namenda no recent follow-up. No tobacco. 3-4 yearly. Son   from fall at [facility]. 2 grandchildren      FH: +heart failure, prostate cancer, heart disease, OA          - ovarin, breast cancer, mental illness     Review of systems: Up-to-date on eye exams. Delinquent dental exams. Follow greater than a year ago. No recurrence. Occasional wheeze. No breathing issues today. Denies any chest pain palpitations or increased lower extremity edema. Rare GE reflux. No bloody stools or change in stools. Told she needs no more colonoscopies. Urinary incontinence. No vaginal complaints. Contact dermatitis perineum. Osteoarthritis.  Skin cancers with routine follow-up with dermatology    Constitutional, ent, CV, respiratory, GI, , joint, skin, allergic and psychiatric ROS reviewed and negative except for above    Allergies   Allergen Reactions    Penicillins Hives    Sulfa Antibiotics Itching    Cymbalta [Duloxetine Hcl]     Levofloxacin     Lyrica [Pregabalin]        Outpatient Medications Marked as Taking for the 20 encounter (Office Visit) with Alma Springer MD   Medication Sig Dispense Refill    metoprolol succinate (TOPROL XL) 50 MG extended release tablet TAKE 1 TABLET EVERY DAY 90 tablet 3    amLODIPine (NORVASC) 5 MG tablet TAKE 1 TABLET EVERY DAY 90 tablet 3    aspirin EC 81 MG EC tablet Take 1 tablet by mouth daily 30 tablet 3             Past Medical History:   Diagnosis Date    Allergic rhinitis     Breast mass in female 2012    Cataract 2012    Chest pain 13    [facility]iscan [facility]: Dr. Brody Goodman    Closed fracture of first metatarsal bone     Dr. Cornel Leslie HTN (hypertension)     Hyperlipidemia     LLQ abdominal pain 2012    Osteoarthritis     Osteopenia 2018    Pneumonia 5/3/2012    Spinal stenosis     Dr. Nisa Edwards,        Past Surgical History:   Procedure Laterality Date   Barbie Resendez  12    Dr. Sabrina Dawn      Dr. Breana Oliveros      Spinal stimulator    TOTAL HIP ARTHROPLASTY  11    Left Dr. Deepika Zuniga at [facility]  12    Right Dr. Deepika Zuniga             Family History   Problem Relation Age of Onset    Heart Disease Mother          at 80 heart failure    Cancer Father          at 76 prostate CA             Objective     BP (!) 148/72   Pulse 52   Resp 16   Ht 5' 7\" (1.702 m)   Wt 191 lb (86.6 kg)   SpO2 94% Comment: RA  BMI 29.91 kg/m²     @LASTSAO2(3)@    Wt Readings from Last 3 Encounters:   20 191 lb (86.6 kg)   19 190 lb (86.2 kg)   18 198 lb (89.8 kg)       Physical Exam     NAD alert and cooperative  HEENT: TMs unremarkable hard of hearing. Throat is clear. Good upstroke of the carotids. Appropriate. Lungs few rales right base. No rhonchi. No wheeze. Mild kyphosis. Cardiovascular exam ectopy. I detect no murmur. Breast without any dominant masses or discharge. Positive obesity. No epigastric tenderness mass or rebound. Good range of motion the hips. Mild crepitus of the knees. Diminished but present lower extremity pulses. Multiple hyperpigmented and scaling lesions. No suspicious rash.       Chemistry        Component Value Date/Time     2019 1135    K 4.9 2019 1135     2019 1135    CO2 26 2019 1135    BUN 24 (H) 2019 1135    CREATININE 0.7 2019 1135        Component Value Date/Time    CALCIUM 9.7 2019 1135    ALKPHOS 94 04/03/2019 1135    AST 14 (L) 04/03/2019 1135    ALT 10 04/03/2019 1135    BILITOT <0.2 04/03/2019 1135    BILITOT 0.5 11/16/2011            Lab Results   Component Value Date    WBC 4.9 03/10/2017    HGB 12.8 03/10/2017    HCT 38.8 03/10/2017    MCV 90.5 03/10/2017     03/10/2017     Lab Results   Component Value Date    LABA1C 6.3 01/22/2020     Lab Results   Component Value Date    .0 04/03/2019     Lab Results   Component Value Date    LABA1C 6.3 01/22/2020     No components found for: CHLPL  Lab Results   Component Value Date    TRIG 287 (H) 04/03/2019    TRIG 169 (H) 03/10/2017    TRIG 233 (H) 09/16/2016     Lab Results   Component Value Date    HDL 49 04/03/2019    HDL 51 03/10/2017    HDL 51 09/16/2016     Lab Results   Component Value Date    LDLCALC 121 (H) 04/03/2019    LDLCALC 126 (H) 03/10/2017    LDLCALC 140 (H) 09/16/2016     Lab Results   Component Value Date    LABVLDL 57 04/03/2019    LABVLDL 34 03/10/2017    LABVLDL 47 09/16/2016       Old labs and records reviewed or requested  Discussed past lab and studies with patient      Diagnosis Orders   1. Ventricular ectopics  Estelle Archer MD, Cardiology, [facility]   2. Primary osteoarthritis, unspecified site     3. Mixed hyperlipidemia  Lipid Panel   4. Cyst of breast, unspecified laterality     5. Essential hypertension, benign  Comprehensive Metabolic Panel    EKG 12 Lead   6. Gastroesophageal reflux disease without esophagitis     7. Osteopenia, unspecified location  Vitamin D 25 Hydroxy    DEXA BONE DENSITY 2 SITES   8. Prediabetes  POCT glycosylated hemoglobin (Hb A1C)   9. Mixed stress and urge urinary incontinence     10. Vitamin D deficiency  DEXA BONE DENSITY 2 SITES   11. Dyspepsia  CBC    BLOOD OCCULT STOOL SCREEN #1   12. Disorder of bone density and structure, unspecified   DEXA BONE DENSITY 2 SITES   13. Palpitation  Holter Monitor 24 Hour   14.  Need for influenza vaccination  INFLUENZA, TRIV, INACTIVATED, SUBUNIT, ADJUVANTED, 65 YRS AND OLDER, IM, PREFILL SYR, 0.5ML (FLUAD TRIV)     Ventricular ectopy hypertension with nocturnal smothering and chest discomfort. Certainly sounds more like acid indigestion or sleep apnea however in view of EKG with multifocal PVCs will get Holter and have her follow-up with her cardiologist.  Basic metabolic profile no change in medications at this time. Hyperlipidemia. Recheck lipid profile. She is starting to follow diet more and is lost 9 pounds. Intermittent dyspepsia. Nocturnal smothering. Possible GE reflux. Prilosec at nighttime. Not eat within 3 hours of going to bed. Stool occult. CBC. Osteopenia. Recheck bone density. Vitamin D. We will check this today. Urinary incontinence. Follow-up pelvic floor's clinic as needed. Osteoarthritis doing well. Diagnosis and treatment discussed.   Possible side effects of medication reviewed  Patients questions answered  Follow up understood  Pt aware if they are not contacted about any test results , this does not mean they are normal.  They should call    History and Physical    Presents for physical exam.  Chronic health issues include spinal stenosis, osteoarthritis, hyperlipidemia, benign breast cysts, hypertension, GE reflux, osteopenia, prediabetes, urinary incontinence,  with dementia    Wt Readings from Last 3 Encounters:   04/03/19 190 lb (86.2 kg)   04/17/18 198 lb (89.8 kg)   03/10/17 194 lb (88 kg)     BP Readings from Last 3 Encounters:   07/08/19 (!) 139/43   04/03/19 (!) 147/80   04/17/18 (!) 150/76       Patient Active Problem List   Diagnosis    Spinal stenosis    Osteoarthritis    Allergic rhinitis    Hyperlipidemia    Closed fracture of first metatarsal bone    Breast cyst    History of pneumonia    Essential hypertension, benign    Gastroesophageal reflux disease without esophagitis    Osteopenia    Prediabetes    Mixed stress and urge urinary incontinence Osteoarthritis     Osteopenia 2018    Pneumonia 5/3/2012    Spinal stenosis     Dr. Maggie Belcher,      Past Surgical History:   Procedure Laterality Date    CATARACT REMOVAL  12    Dr. Ginette Mota      Dr. Ashley Capellan      Spinal stimulator    TOTAL HIP ARTHROPLASTY  11    Left Dr. Serenity Cuellar [facility]  12    Right Dr. Serenity Cuellar     Family History   Problem Relation Age of Onset    Heart Disease Mother          at 80 heart failure    Cancer Father          at 76 prostate CA     Review of systems: Up-to-date eye exam.  No concussions or seizures. No concerns with memory. Positive stressors with 's dementia. No wheezing. Intermittent chest discomfort at night. Palpitations. Sinus bradycardia. Rare GE reflux. No stool problems positive urinary incontinence. Osteoarthritis. Physical Exam:   Vitals:    20 1503   BP: (!) 148/72   Pulse: 52   Resp: 16   SpO2: 94%   Weight: 191 lb (86.6 kg)   Height: 5' 7\" (1.702 m)     Body mass index is 29.91 kg/m². Constitutional: She is oriented to person, place, and time. She appears well-developed and well-nourished. No distress. HEENT: Good dentition. Throat is clear. Good upstroke of the carotids. No wheeze. Mild kyphosis. Lungs rales right base. No wheeze or rhonchi. Cardiovascular exam ectopy. I do not detect any murmurs. Breast without any masses or discharge. Positive obesity. Wearing a depends. No hepatosplenomegaly or point tenderness. Diminished but present pulses lower extremities. Skin: Skin is warm and dry. There is no rash or erythema. No suspicious lesions noted. Psychiatric: She has a normal mood and affect. Her speech is normal and behavior is normal. Judgment, cognition and memory are normal.     Assessment/Plan:   Diagnosis Orders   1. Ventricular ectopics  Estelle Smiley MD, Cardiology, [facility]   2.  Primary osteoarthritis, unspecified site     3. Mixed hyperlipidemia  Lipid Panel   4. Cyst of breast, unspecified laterality     5. Essential hypertension, benign  Comprehensive Metabolic Panel    EKG 12 Lead   6. Gastroesophageal reflux disease without esophagitis     7. Osteopenia, unspecified location  Vitamin D 25 Hydroxy    DEXA BONE DENSITY 2 SITES   8. Prediabetes  POCT glycosylated hemoglobin (Hb A1C)   9. Mixed stress and urge urinary incontinence     10. Vitamin D deficiency  DEXA BONE DENSITY 2 SITES   11. Dyspepsia  CBC    BLOOD OCCULT STOOL SCREEN #1   12. Disorder of bone density and structure, unspecified   DEXA BONE DENSITY 2 SITES   13. Palpitation  Holter Monitor 24 Hour   14. Need for influenza vaccination  INFLUENZA, TRIV, INACTIVATED, SUBUNIT, ADJUVANTED, 65 YRS AND OLDER, IM, PREFILL SYR, 0.5ML (FLUAD TRIV)     High-dose flu given. Discussed tetanus vaccine DEXA scan. Living well. Mammogram in May.   Pelvic floor clinic

## 2020-01-22 NOTE — PATIENT INSTRUCTIONS
breathing or have too little air flowing into your lungs during sleep. They also find out how much oxygen you have in your blood during sleep. A sleep study may take place in your home. Or it might take place at a sleep center, where you will spend the night. How is it treated? Sleep apnea is often treated with devices that deliver air through a mask to help keep your airways open. These include:  · Continuous positive airway pressure (CPAP). This increases air pressure in your throat. It keeps your airway open when you breathe in. It's the most common device. · Bilevel positive airway pressure (BiPAP). This uses different air pressures when you breathe in and out. · Adaptive servo ventilation (ASV). It senses pauses in breathing and adjusts air pressure. It's mostly used for central sleep apnea. If your tonsils or other tissues are blocking your airway, your doctor may suggest surgery to open the airway. How can you care for yourself? You may be able to treat mild sleep apnea by making changes in how you live and the way you sleep. For example, it may help to:  · Lose weight if you are overweight. · Sleep on your side, not your back. · Avoid alcohol and medicines such as sedatives before bed. You may also try an oral breathing device. It helps keep your airways open while you sleep. Where can you learn more? Go to https://[email or web address]. org and sign in to your [employer] account. Enter S121 in the Qurater box to learn more about \"Learning About Sleep Apnea. \"     If you do not have an account, please click on the \"Sign Up Now\" link. Current as of: June 9, 2019  Content Version: 12.3  © 3735-6758 Healthwise, Incorporated. Care instructions adapted under license by [facility] [employer] [facility] ([facility]).  If you have questions about a medical condition or this instruction, always ask your healthcare professional. [address] any warranty or liability for your use of this information.

## 2020-01-23 RX ORDER — VITAMIN E 268 MG
180 CAPSULE ORAL DAILY
COMMUNITY
End: 2020-10-08

## 2020-01-23 RX ORDER — M-VIT,TX,IRON,MINS/CALC/FOLIC 27MG-0.4MG
1 TABLET ORAL NIGHTLY
COMMUNITY

## 2020-01-23 RX ORDER — CHLORAL HYDRATE 500 MG
1000 CAPSULE ORAL NIGHTLY
COMMUNITY

## 2020-01-28 ENCOUNTER — ANESTHESIA EVENT (OUTPATIENT)
Dept: OPERATING ROOM | Age: 80
End: 2020-01-28
Payer: MEDICARE

## 2020-01-29 ENCOUNTER — HOSPITAL ENCOUNTER (OUTPATIENT)
Age: 80
Setting detail: OUTPATIENT SURGERY
Discharge: HOME | End: 2020-01-29
Attending: FAMILY MEDICINE | Admitting: FAMILY MEDICINE
Payer: MEDICARE

## 2020-01-29 ENCOUNTER — ANESTHESIA (OUTPATIENT)
Dept: OPERATING ROOM | Age: 80
End: 2020-01-29
Payer: MEDICARE

## 2020-01-29 VITALS
BODY MASS INDEX: 29.36 KG/M2 | HEIGHT: 67 IN | WEIGHT: 187.06 LBS | HEART RATE: 53 BPM | SYSTOLIC BLOOD PRESSURE: 147 MMHG | DIASTOLIC BLOOD PRESSURE: 57 MMHG | RESPIRATION RATE: 16 BRPM | OXYGEN SATURATION: 98 % | TEMPERATURE: 97 F

## 2020-01-29 VITALS
DIASTOLIC BLOOD PRESSURE: 79 MMHG | RESPIRATION RATE: 15 BRPM | OXYGEN SATURATION: 97 % | SYSTOLIC BLOOD PRESSURE: 208 MMHG

## 2020-01-29 PROCEDURE — 2580000003 HC RX 258: Performed by: FAMILY MEDICINE

## 2020-01-29 PROCEDURE — 2500000003 HC RX 250 WO HCPCS: Performed by: FAMILY MEDICINE

## 2020-01-29 PROCEDURE — 3700000001 HC ADD 15 MINUTES (ANESTHESIA): Performed by: FAMILY MEDICINE

## 2020-01-29 PROCEDURE — 2709999900 HC NON-CHARGEABLE SUPPLY: Performed by: FAMILY MEDICINE

## 2020-01-29 PROCEDURE — 7100000011 HC PHASE II RECOVERY - ADDTL 15 MIN: Performed by: FAMILY MEDICINE

## 2020-01-29 PROCEDURE — 6370000000 HC RX 637 (ALT 250 FOR IP): Performed by: FAMILY MEDICINE

## 2020-01-29 PROCEDURE — 3600000002 HC SURGERY LEVEL 2 BASE: Performed by: FAMILY MEDICINE

## 2020-01-29 PROCEDURE — 6360000002 HC RX W HCPCS: Performed by: NURSE ANESTHETIST, CERTIFIED REGISTERED

## 2020-01-29 PROCEDURE — 7100000000 HC PACU RECOVERY - FIRST 15 MIN: Performed by: FAMILY MEDICINE

## 2020-01-29 PROCEDURE — 3600000012 HC SURGERY LEVEL 2 ADDTL 15MIN: Performed by: FAMILY MEDICINE

## 2020-01-29 PROCEDURE — 7100000010 HC PHASE II RECOVERY - FIRST 15 MIN: Performed by: FAMILY MEDICINE

## 2020-01-29 PROCEDURE — 3700000000 HC ANESTHESIA ATTENDED CARE: Performed by: FAMILY MEDICINE

## 2020-01-29 PROCEDURE — 2580000003 HC RX 258: Performed by: ANESTHESIOLOGY

## 2020-01-29 PROCEDURE — 88305 TISSUE EXAM BY PATHOLOGIST: CPT

## 2020-01-29 RX ORDER — BACITRACIN ZINC AND POLYMYXIN B SULFATE 500; 1000 [USP'U]/G; [USP'U]/G
OINTMENT TOPICAL
Status: COMPLETED | OUTPATIENT
Start: 2020-01-29 | End: 2020-01-29

## 2020-01-29 RX ORDER — FENTANYL CITRATE 50 UG/ML
25 INJECTION, SOLUTION INTRAMUSCULAR; INTRAVENOUS EVERY 5 MIN PRN
Status: DISCONTINUED | OUTPATIENT
Start: 2020-01-29 | End: 2020-01-29 | Stop reason: HOSPADM

## 2020-01-29 RX ORDER — ONDANSETRON 2 MG/ML
4 INJECTION INTRAMUSCULAR; INTRAVENOUS
Status: DISCONTINUED | OUTPATIENT
Start: 2020-01-29 | End: 2020-01-29 | Stop reason: HOSPADM

## 2020-01-29 RX ORDER — FENTANYL CITRATE 50 UG/ML
INJECTION, SOLUTION INTRAMUSCULAR; INTRAVENOUS PRN
Status: DISCONTINUED | OUTPATIENT
Start: 2020-01-29 | End: 2020-01-29 | Stop reason: SDUPTHER

## 2020-01-29 RX ORDER — OXYCODONE HYDROCHLORIDE AND ACETAMINOPHEN 5; 325 MG/1; MG/1
2 TABLET ORAL PRN
Status: DISCONTINUED | OUTPATIENT
Start: 2020-01-29 | End: 2020-01-29 | Stop reason: HOSPADM

## 2020-01-29 RX ORDER — ACETAMINOPHEN 500 MG
1000 TABLET ORAL ONCE
Status: COMPLETED | OUTPATIENT
Start: 2020-01-29 | End: 2020-01-29

## 2020-01-29 RX ORDER — SODIUM CHLORIDE 0.9 % (FLUSH) 0.9 %
10 SYRINGE (ML) INJECTION PRN
Status: DISCONTINUED | OUTPATIENT
Start: 2020-01-29 | End: 2020-01-29 | Stop reason: HOSPADM

## 2020-01-29 RX ORDER — MAGNESIUM HYDROXIDE 1200 MG/15ML
LIQUID ORAL CONTINUOUS PRN
Status: COMPLETED | OUTPATIENT
Start: 2020-01-29 | End: 2020-01-29

## 2020-01-29 RX ORDER — PROPOFOL 10 MG/ML
INJECTION, EMULSION INTRAVENOUS PRN
Status: DISCONTINUED | OUTPATIENT
Start: 2020-01-29 | End: 2020-01-29 | Stop reason: SDUPTHER

## 2020-01-29 RX ORDER — OXYCODONE HYDROCHLORIDE AND ACETAMINOPHEN 5; 325 MG/1; MG/1
1 TABLET ORAL PRN
Status: DISCONTINUED | OUTPATIENT
Start: 2020-01-29 | End: 2020-01-29 | Stop reason: HOSPADM

## 2020-01-29 RX ORDER — MIDAZOLAM HYDROCHLORIDE 1 MG/ML
INJECTION INTRAMUSCULAR; INTRAVENOUS PRN
Status: DISCONTINUED | OUTPATIENT
Start: 2020-01-29 | End: 2020-01-29 | Stop reason: SDUPTHER

## 2020-01-29 RX ORDER — SODIUM CHLORIDE 9 MG/ML
INJECTION, SOLUTION INTRAVENOUS CONTINUOUS
Status: DISCONTINUED | OUTPATIENT
Start: 2020-01-29 | End: 2020-01-29 | Stop reason: HOSPADM

## 2020-01-29 RX ORDER — SODIUM CHLORIDE 0.9 % (FLUSH) 0.9 %
10 SYRINGE (ML) INJECTION EVERY 12 HOURS SCHEDULED
Status: DISCONTINUED | OUTPATIENT
Start: 2020-01-29 | End: 2020-01-29 | Stop reason: HOSPADM

## 2020-01-29 RX ADMIN — SODIUM CHLORIDE: 9 INJECTION, SOLUTION INTRAVENOUS at 09:16

## 2020-01-29 RX ADMIN — ACETAMINOPHEN 1000 MG: 500 TABLET ORAL at 09:26

## 2020-01-29 RX ADMIN — MIDAZOLAM 0.5 MG: 1 INJECTION INTRAMUSCULAR; INTRAVENOUS at 10:15

## 2020-01-29 RX ADMIN — PROPOFOL 50 MG: 10 INJECTION, EMULSION INTRAVENOUS at 10:20

## 2020-01-29 RX ADMIN — FENTANYL CITRATE 25 MCG: 50 INJECTION INTRAMUSCULAR; INTRAVENOUS at 10:15

## 2020-01-29 RX ADMIN — MIDAZOLAM 0.5 MG: 1 INJECTION INTRAMUSCULAR; INTRAVENOUS at 10:18

## 2020-01-29 ASSESSMENT — PULMONARY FUNCTION TESTS
PIF_VALUE: 0
PIF_VALUE: 1
PIF_VALUE: 0
PIF_VALUE: 2
PIF_VALUE: 0
PIF_VALUE: 2
PIF_VALUE: 1
PIF_VALUE: 0

## 2020-01-29 ASSESSMENT — PAIN SCALES - GENERAL
PAINLEVEL_OUTOF10: 0

## 2020-01-29 ASSESSMENT — PAIN - FUNCTIONAL ASSESSMENT: PAIN_FUNCTIONAL_ASSESSMENT: 0-10

## 2020-01-29 NOTE — ANESTHESIA PRE PROCEDURE
Smokeless tobacco: Never Used   Substance Use Topics    Alcohol use: No    Drug use: Never     Medications  No current facility-administered medications on file prior to encounter. Current Outpatient Medications on File Prior to Encounter   Medication Sig Dispense Refill    Multiple Vitamins-Minerals (THERAPEUTIC MULTIVITAMIN-MINERALS) tablet Take 1 tablet by mouth nightly      Omega-3 Fatty Acids (FISH OIL) 1000 MG CAPS Take 1,000 mg by mouth nightly Not sure of dosage      vitamin E 400 UNIT capsule Take 180 Units by mouth daily      NONFORMULARY daily Tumeric-puts 1/4 tsp in shake      metoprolol succinate (TOPROL XL) 50 MG extended release tablet TAKE 1 TABLET EVERY DAY (Patient taking differently: nightly ) 90 tablet 3    amLODIPine (NORVASC) 5 MG tablet TAKE 1 TABLET EVERY DAY (Patient taking differently: nightly ) 90 tablet 3    Glucosamine-Chondroit-Vit C-Mn (GLUCOSAMINE 1500 COMPLEX) CAPS Take 1,500 mg by mouth daily.  (Patient taking differently: Take 1,500 mg by mouth nightly ) 30 capsule 5    omeprazole (PRILOSEC) 20 MG delayed release capsule Take 1 capsule by mouth 2 times daily (before meals) 60 capsule 0     Current Facility-Administered Medications   Medication Dose Route Frequency Provider Last Rate Last Dose    acetaminophen (TYLENOL) tablet 1,000 mg  1,000 mg Oral Once Rank Sabas Ervin MD        0.9 % sodium chloride infusion   Intravenous Continuous Alo Burnette MD        sodium chloride flush 0.9 % injection 10 mL  10 mL Intravenous 2 times per day Alo Burnette MD        sodium chloride flush 0.9 % injection 10 mL  10 mL Intravenous PRN Alo Burnette MD         Vital Signs (Current)   Vitals:    01/23/20 1117   Weight: 191 lb (86.6 kg)   Height: 5' 7\" (1.702 m)                                          BP Readings from Last 3 Encounters:   01/22/20 (!) 148/72   07/08/19 (!) 139/43   04/03/19 (!) 147/80     Vital Signs Statistics (for past 48 hrs)     No data recorded  BP Readings from Last 3 Encounters:   01/22/20 (!) 148/72   07/08/19 (!) 139/43   04/03/19 (!) 147/80       BMI  Body mass index is 29.91 kg/m². Estimated body mass index is 29.91 kg/m² as calculated from the following:    Height as of this encounter: 5' 7\" (1.702 m). Weight as of this encounter: 191 lb (86.6 kg). CBC   Lab Results   Component Value Date    WBC 6.2 01/22/2020    RBC 4.37 01/22/2020    HGB 13.2 01/22/2020    HCT 40.0 01/22/2020    MCV 91.5 01/22/2020    RDW 13.7 01/22/2020     01/22/2020     CMP    Lab Results   Component Value Date     01/22/2020    K 4.2 01/22/2020    CL 98 01/22/2020    CO2 24 01/22/2020    BUN 18 01/22/2020    CREATININE 0.8 01/22/2020    GFRAA >60 01/22/2020    GFRAA >60 11/14/2012    AGRATIO 1.6 01/22/2020    LABGLOM >60 01/22/2020    GLUCOSE 160 01/22/2020    PROT 6.8 01/22/2020    PROT 7.0 11/14/2012    CALCIUM 9.4 01/22/2020    BILITOT <0.2 01/22/2020    BILITOT 0.5 11/16/2011    ALKPHOS 81 01/22/2020    AST 15 01/22/2020    ALT 11 01/22/2020     BMP    Lab Results   Component Value Date     01/22/2020    K 4.2 01/22/2020    CL 98 01/22/2020    CO2 24 01/22/2020    BUN 18 01/22/2020    CREATININE 0.8 01/22/2020    CALCIUM 9.4 01/22/2020    GFRAA >60 01/22/2020    GFRAA >60 11/14/2012    LABGLOM >60 01/22/2020    GLUCOSE 160 01/22/2020     POCGlucose  No results for input(s): GLUCOSE in the last 72 hours.    Coags  No results found for: PROTIME, INR, APTT  HCG (If Applicable) No results found for: PREGTESTUR, PREGSERUM, HCG, HCGQUANT   ABGs No results found for: PHART, PO2ART, PFV3YTS, ZIZ5HVU, BEART, P3FTUPCN   Type & Screen (If Applicable)  No results found for: LABABO, LABRH                         BMI: Wt Readings from Last 3 Encounters:       NPO Status:                          Anesthesia Evaluation  Patient summary reviewed no history of anesthetic complications:   Airway: Mallampati: II  TM distance: >3 FB   Neck ROM: full  Mouth opening: > = 3 FB Dental: normal exam         Pulmonary:   (+) pneumonia:      (-) COPD                           Cardiovascular:    (+) hypertension:,     (-) valvular problems/murmurs, past MI, CABG/stent and dysrhythmias            Stress test reviewed                Neuro/Psych:   (+) neuromuscular disease:, headaches: migraine headaches,             GI/Hepatic/Renal:   (+) GERD:,      (-) liver disease, no renal disease, bowel prep and no morbid obesity       Endo/Other:    (+) : arthritis: OA., .    (-) diabetes mellitus, hypothyroidism, hyperthyroidism               Abdominal:           Vascular:     - DVT and PE. Anesthesia Plan      MAC     ASA 2       Induction: intravenous. Anesthetic plan and risks discussed with patient. Plan discussed with CRNA. Attending anesthesiologist reviewed and agrees with Pre Eval content              This pre-anesthesia assessment may be used as a history and physical.    DOS STAFF ADDENDUM:    Pt seen and examined, chart reviewed (including anesthesia, drug and allergy history). No interval changes to history and physical examination. Anesthetic plan, risks, benefits, alternatives, and personnel involved discussed with patient. Patient verbalized an understanding and agrees to proceed.       To Turner MD  January 29, 2020  8:52 AM

## 2020-01-29 NOTE — PROGRESS NOTES
Phone message left with  Galina Dickerson, cell# no VM set up, home # busy x`2,to call PAT dept at [phone]  for history review and surgery instructions.
Pt awake. Denies pain. To phase 2 care.
To pacu from OR. Pt awake. Denies pain. PSO to incision to right forehead - sutures to incision to left forehead, band aid to back dry and intact. IV infusing. Monitor in sinus rhythm with occ pac noted.
the child is discharged. Please do not bring other children with you. For your comfort, please wear simple loose fitting clothing to the hospital.  Please do not bring valuables. Do not wear any make-up or nail polish on your fingers or toes      For your safety, please do not wear any jewelry or body piercing's on the day of surgery. All jewelry must be removed. If you have dentures, they will be removed before going to operating room. For your convenience, we will provide you with a container. If you wear contact lenses or glasses, they will be removed, please bring a case for them. If you have a living will and a durable power of  for healthcare, please bring in a copy. As part of our patient safety program to minimize surgical site infections, we ask you to do the following:    · Please notify your surgeon if you develop any illness between         now and the  day of your surgery. · This includes a cough, cold, fever, sore throat, nausea,         or vomiting, and diarrhea, etc.  ·  Please notify your surgeon if you experience dizziness, shortness         of breath or blurred vision between now and the time of your surgery. Do not shave your operative site 96 hours prior to surgery. For face and neck surgery, men may use an electric razor 48 hours   prior to surgery. You may shower the night before surgery or the morning of   your surgery with an antibacterial soap. You will need to bring a photo ID and insurance card    [facility] has an onsite pharmacy, would you like to utilize our pharmacy     If you will be staying overnight and use a C-pap machine, please bring   your C-pap to hospital     Our goal is to provide you with excellent care, therefore, visitors will be limited to two(2) in the room at a time so that we may focus on providing this care for you. Please contact pre-admission testing if you have any further questions.

## 2020-01-29 NOTE — H&P
[facility] OTHER SURGICAL HISTORY  2006    Spinal stimulator    TOTAL HIP ARTHROPLASTY  11/30/11    Left Dr. Jaz Nguyen at /[facility]  1/4/12    Right Dr. Jaz Nguyen      Prior to Admission medications    Medication Sig Start Date End Date Taking? Authorizing Provider   Multiple Vitamins-Minerals (THERAPEUTIC MULTIVITAMIN-MINERALS) tablet Take 1 tablet by mouth nightly   Yes Historical Provider, MD   Omega-3 Fatty Acids (FISH OIL) 1000 MG CAPS Take 1,000 mg by mouth nightly Not sure of dosage   Yes Historical Provider, MD   vitamin E 400 UNIT capsule Take 180 Units by mouth daily   Yes Historical Provider, MD   NONFORMULARY daily Tumeric-puts 1/4 tsp in shake   Yes Historical Provider, MD   omeprazole (PRILOSEC) 20 MG delayed release capsule Take 1 capsule by mouth 2 times daily (before meals) 1/22/20  Yes Harriet Comer MD   metoprolol succinate (TOPROL XL) 50 MG extended release tablet TAKE 1 TABLET EVERY DAY  Patient taking differently: nightly  8/10/19  Yes Harriet Comer MD   amLODIPine (NORVASC) 5 MG tablet TAKE 1 TABLET EVERY DAY  Patient taking differently: nightly  8/10/19  Yes Harriet Comer MD   Glucosamine-Chondroit-Vit C-Mn (GLUCOSAMINE 1500 COMPLEX) CAPS Take 1,500 mg by mouth daily. Patient taking differently: Take 1,500 mg by mouth nightly  5/11/11  Yes Abi Mac MD     Allergies   Allergen Reactions    Penicillins Hives    Sulfa Antibiotics Itching    Levofloxacin      \" Cannot remember\"    Cymbalta [Duloxetine Hcl] Anxiety    Lyrica [Pregabalin] Anxiety    Tramadol Nausea Only        Review of Systems:   Pertinent items are noted in HPI. Objective:       Vital signs shows that the patient is afebrile and her vital signs are stable.      Scheduled Meds:   sodium chloride flush  10 mL Intravenous 2 times per day     Continuous Infusions:   sodium chloride 125 mL/hr at 01/29/20 0916     PRN Meds:sodium chloride flush, fentanNYL, HYDROmorphone, fentanNYL, HYDROmorphone, oxyCODONE-acetaminophen **OR** oxyCODONE-acetaminophen, ondansetron          Physical Exam:   Constitutional: alert, no acute distress, well hydrated, well developed, well nourished. well groomed appropriate for age  Skin: normal color, no rashes, no unusual bruising. Palpation of scalp skin and inspection of body hair show no clinically significant lesions. Inspection and/or palpation of skin and subcutaneous tissues of head shows multiple skin lesins on face, otherwise no clinically significant lesions. Inspection and/or or palpation of skin and subcutaneous tissues of neck shows no clinically significant lesions. Inspection and/or palpation of skin and subcutaneous tissues of abdomen shows no clinically significant lesions. Inspection and/or palpation of skin and subcutaneous tissues of genitalia, groin, and buttocks declined by patient. Inspection and/or palpation of skin and subcutaneous tissues of back shows an abnormal pigmented lesion, otherwise no clinically significant lesions. Inspection and/or palpation of skin and subcutaneous tissues of chest shows no clinically significant lesions. Inspection and/or palpation of skin and subcutaneous tissues of RIGHT upper extremity shows no clinically significant lesions. Inspection and/orpalpation of skin and subcutaneous tissues of LEFT upper extremity shows no clinically significant lesions. Inspection and/or palpation of skin and subcutaneous tissues of RIGHT lower extremity shows no clinically significant lesions. Inspection and/or palpation of skin and subcutaneous tissues of LEFT lower extremity shows no clinically significant lesions. Apocrine and eccrine sweat gland regions normal with no evidence of hyperhidrosis, chromhidrosis, nor bromhidrosis. No lymphadenopathy in bilateral cervical nor axillary lymph node basins. Skin normal turgor, normal capillary refill, and warm to touch.   No cyanosis, clubbing, inflammation,  ischemia, infections, nodes, nor petechiae in digits nor nail beds in four extremties. Eyes: pupils equal, no injection, no icterus. conjunctiva are moist and clear bilaterally, normal lid motility bilaterally, extraoccular muscles intact bilaterally, pupils equal, round & reactive to light bilaterally, normal light reaction, and vision grossly normal  Ears/Nose/Throat: external ears normal, hearing normal, external nose normal, tongue normal. maxillary and mandibular teeth normal upper and lower lips normal maxillary and mandibular gums normal mucous membranes normal hard and soft palates normal tonsils normal posterior pharynx normal.  Neck: Supple, no adenopathy, no masses, no thyromegaly, no thyroid tenderness, nor nodules. Chest: normal chest inspection. Peripheral edema is present in the bilateral lower extremities. Varicose veins in bilateral legs. Pulses intact in four extremities. Skin warm. Abdomen: Anal and genitourinary exams declined by patient. No masses nor organomegaly. Neuro: cranial nerves grossly intact, sensation intact bilaterally. Psych: affect and mood appropriate. normal interaction. oriented to person, place time, and circumstance good eye contact. Assessment:     Active Problems:    * No active hospital problems. *  Resolved Problems:    * No resolved hospital problems. *        Plan:     Excision and curretage multiple skin lesions, out-patient, [facility] Anesthesia, [facility]  Informed operative consent obtained. Follow up in my office one week post-op. Medical Decision Making:  High in severity, high complexity, with decision for surgical versus non-surgical therapy and anesthetic considerations. Records were reviewed. Morbidity and mortality risk is high severity. Counseling: Diagnostic results show prognosis is good.     Instructions for Management: Management of chronic diseases by [facility].  Excision and curretage multiple skin lesions,

## 2020-01-30 PROBLEM — C44.92 SCC (SQUAMOUS CELL CARCINOMA): Status: ACTIVE | Noted: 2020-01-30

## 2020-02-03 NOTE — OP NOTE
incisional  biopsy; description of the consequences of no treatment to the problem  including but not limited to, if cancer, it will grow, spread, invade,  and kill the patient; statement as to the probability of successful  outcome of planned surgery and anesthesia barring occurrence of major  complications being greater than 85%. The patient was then afforded the  opportunity to ask questions. All questions were answered and the  patient understood the answers to the questions and the informed  operative consent. On the day of surgery, the patient was taken to the operating room and  placed on the operating table in supine position. Sites for the surgery  had been marked preoperatively per the surgeon. Lines for elliptical  excision were demarcated with skin marking pen and the surgeon's  initials were written inside the skin paddles to be excised. The  patient underwent sedation anesthesia and then the sites for surgery  were cleansed with alcohol swab and local infiltration per the surgeon  was carried out with the above-named local anesthetic admixture. Additional local anesthetic was administered at the end of the  procedure. Surgical sites were then cleansed with surgical skin cleansing solution  after three minutes  by timer. They were draped in a routine  sterile fashion. The 2.8-cm left forehead lesion was excised in an  elliptical fashion with a #15 scalpel blade and forceps dissection under  4.5 power loupe magnification. Specimen was submitted to Pathology for  microscopic examination. The Malis bipolar cautery was used for hemostasis. The 2.8-cm incision was then closed with a 2.8-cm layered intermediate  incision repair using interrupted inverted simple sutures of 5-0  Monocryl in the adipose and the deep dermis. The superficial dermis and  epidermis were closed with a running vertical mattress suture of 6-0  Prolene.   The skin was washed and dried and dressed with

## 2020-02-10 ENCOUNTER — HOSPITAL ENCOUNTER (OUTPATIENT)
Dept: NON INVASIVE DIAGNOSTICS | Age: 80
Discharge: HOME | End: 2020-02-10
Payer: MEDICARE

## 2020-02-10 ENCOUNTER — HOSPITAL ENCOUNTER (OUTPATIENT)
Dept: WOMENS IMAGING | Age: 80
Discharge: HOME | End: 2020-02-10
Payer: MEDICARE

## 2020-02-10 PROCEDURE — 93225 XTRNL ECG REC<48 HRS REC: CPT

## 2020-02-10 PROCEDURE — 93226 XTRNL ECG REC<48 HR SCAN A/R: CPT

## 2020-02-10 PROCEDURE — 77080 DXA BONE DENSITY AXIAL: CPT

## 2020-02-12 LAB
ACQUISITION DURATION: NORMAL S
AVERAGE HEART RATE: 59 BPM
EKG DIAGNOSIS: NORMAL
FASTEST SUPRAVENTRICULAR RATE: 145 BPM
HOLTER MAX HEART RATE: 87 BPM
HOOKUP DATE: NORMAL
HOOKUP TIME: NORMAL
LONGEST SUPRAVENTRICULAR RATE: 102 BPM
MAX HEART RATE TIME/DATE: NORMAL
MIN HEART RATE TIME/DATE: NORMAL
MIN HEART RATE: 48 BPM
NUMBER OF FASTEST SUPRAVENTRICULAR BEATS: 3
NUMBER OF LONGEST SUPRAVENTRICULAR BEATS: 10
NUMBER OF QRS COMPLEXES: NORMAL
NUMBER OF SUPRAVENTRICULAR BEATS IN RUNS: 125
NUMBER OF SUPRAVENTRICULAR COUPLETS: 45
NUMBER OF SUPRAVENTRICULAR ECTOPICS: 1250
NUMBER OF SUPRAVENTRICULAR ISOLATED BEATS: 1035
NUMBER OF SUPRAVENTRICULAR RUNS: 21
NUMBER OF VENTRICULAR BEATS IN RUNS: 0
NUMBER OF VENTRICULAR BIGEMINAL CYCLES: 378
NUMBER OF VENTRICULAR COUPLETS: 8
NUMBER OF VENTRICULAR ECTOPICS: 8127
NUMBER OF VENTRICULAR ISOLATED BEATS: 8111
NUMBER OF VENTRICULAR RUNS: 0

## 2020-02-13 ENCOUNTER — NURSE ONLY (OUTPATIENT)
Dept: FAMILY MEDICINE CLINIC | Age: 80
End: 2020-02-13
Payer: MEDICARE

## 2020-02-13 PROCEDURE — 82270 OCCULT BLOOD FECES: CPT | Performed by: INTERNAL MEDICINE

## 2020-02-14 LAB
HEMOCCULT STL QL: NORMAL

## 2020-02-21 PROBLEM — I49.3 VENTRICULAR ECTOPY: Status: ACTIVE | Noted: 2020-02-21

## 2020-02-21 NOTE — PROGRESS NOTES
[address]   Cardiac Consultation    Referring Provider:  Bubba Burden MD     Chief Complaint   Patient presents with    New Patient    Chest Pain     nocturnal front of neck/chest pain    Stress      has dementia and quite difficult       HPI:  Korin Cline is a 78 y.o. female who presents to office today as a referrral from her PCP Bubba Burden MD. She reports that she has episodes where she wakes up at night with pressure feeling her upper chest and throat area. She brings a log into the office were she checks her blood pressure and heart rate during the episodes and her heart rate and blood pressures both are elevated and gradually return to normal over ~ half hour. She states she is under a lot of stress because her  has alzheimer's and she has been caring for him. She is really suffering after 64 years of marriage and now he is totally dependent and not aware of where he is and always needs her. She is conflicted and always stressed. Past Medical History:   has a past medical history of Allergic rhinitis, Breast mass in female, Cataract, Chest pain, Closed fracture of first metatarsal bone, HTN (hypertension), Hyperlipidemia, LLQ abdominal pain, Migraine, Osteoarthritis, Osteopenia, Pneumonia, Spinal stenosis, and Ventricular ectopy. Surgical History:   has a past surgical history that includes lumbar laminectomy (2004); Total hip arthroplasty (11/30/11); Total hip arthroplasty (1/4/12); Cataract removal (Bilateral, 04/26/2012); other surgical history (2006); Breast surgery (Right); and back surgery (Left, 1/29/2020). Social History:   reports that she has never smoked. She has never used smokeless tobacco. She reports that she does not drink alcohol or use drugs. Family History:  family history includes Cancer in her father; Heart Disease in her mother.      Home Medications:  Outpatient Encounter Medications as of 2/24/2020   Medication Sig Dispense Refill    metoprolol succinate (TOPROL XL) 50 MG extended release tablet Take 2 tablets by mouth daily 90 tablet 3    Multiple Vitamins-Minerals (THERAPEUTIC MULTIVITAMIN-MINERALS) tablet Take 1 tablet by mouth nightly      Omega-3 Fatty Acids (FISH OIL) 1000 MG CAPS Take 1,000 mg by mouth nightly Not sure of dosage      vitamin E 400 UNIT capsule Take 180 Units by mouth daily      NONFORMULARY daily Tumeric-puts 1/4 tsp in shake      omeprazole (PRILOSEC) 20 MG delayed release capsule Take 1 capsule by mouth 2 times daily (before meals) 60 capsule 0    amLODIPine (NORVASC) 5 MG tablet TAKE 1 TABLET EVERY DAY (Patient taking differently: nightly ) 90 tablet 3    Glucosamine-Chondroit-Vit C-Mn (GLUCOSAMINE 1500 COMPLEX) CAPS Take 1,500 mg by mouth daily. (Patient taking differently: Take 1,500 mg by mouth nightly ) 30 capsule 5    [DISCONTINUED] metoprolol succinate (TOPROL XL) 50 MG extended release tablet TAKE 1 TABLET EVERY DAY (Patient taking differently: nightly ) 90 tablet 3     No facility-administered encounter medications on file as of 2/24/2020. Allergies:  Penicillins; Sulfa antibiotics; Levofloxacin; Cymbalta [duloxetine hcl]; Lyrica [pregabalin]; and Tramadol     Review of Systems   Constitutional: Negative for activity change and appetite change. HENT: Negative for facial swelling and neck pain. Eyes: Negative for discharge and itching. Respiratory: Negative for chest tightness and shortness of breath. Cardiovascular: Negative for palpitations. atypical chest pain. Negative for leg swelling. Gastrointestinal: Negative for abdominal pain and abdominal distention. Genitourinary: Negative. Musculoskeletal: Negative. Skin: Negative for color change and pallor. Neurological: Negative for dizziness, syncope and light-headedness. Hematological: Negative.     Psychiatric/Behavioral: situational distress and anxiety    /60 (Site: Left Upper Arm, Position: during the time she wore her 24 hour monitor. Essential hypertension  Stable  Managed by PCP Ese Garnett MD    Chest pressure  Pharmacological stress test to evaluate for ischemia, but atypical at night under stress. Possible related to stress of caring her her elderly  with Alzheimer. Heart rate elevated likely sinus tach thus 30 day event just to be sure. Plan:  1) Lexiscan stress test to evaluate for ischemia. 2) 30 day event monitor. 3) Increase Toprol XL to 100 mg daily for likely stress and sinus tach  4) Follow in 6-8 weeks. This note was scribed in the presence of Tariq Booker MD by Jayda Cancino RN. The scribes documentation has been prepared under my direction and personally reviewed by me in its entirety. I confirm that the note above accurately reflects all work, treatment, procedures, and medical decision making performed by me. Marry Jackman.  Rachel CASTRO.

## 2020-02-24 ENCOUNTER — OFFICE VISIT (OUTPATIENT)
Dept: CARDIOLOGY CLINIC | Age: 80
End: 2020-02-24
Payer: MEDICARE

## 2020-02-24 VITALS
DIASTOLIC BLOOD PRESSURE: 60 MMHG | SYSTOLIC BLOOD PRESSURE: 138 MMHG | OXYGEN SATURATION: 98 % | BODY MASS INDEX: 29.03 KG/M2 | HEART RATE: 62 BPM | HEIGHT: 67 IN | WEIGHT: 185 LBS

## 2020-02-24 PROCEDURE — 4040F PNEUMOC VAC/ADMIN/RCVD: CPT | Performed by: INTERNAL MEDICINE

## 2020-02-24 PROCEDURE — 1090F PRES/ABSN URINE INCON ASSESS: CPT | Performed by: INTERNAL MEDICINE

## 2020-02-24 PROCEDURE — 93000 ELECTROCARDIOGRAM COMPLETE: CPT | Performed by: INTERNAL MEDICINE

## 2020-02-24 PROCEDURE — G8482 FLU IMMUNIZE ORDER/ADMIN: HCPCS | Performed by: INTERNAL MEDICINE

## 2020-02-24 PROCEDURE — 1036F TOBACCO NON-USER: CPT | Performed by: INTERNAL MEDICINE

## 2020-02-24 PROCEDURE — G8427 DOCREV CUR MEDS BY ELIG CLIN: HCPCS | Performed by: INTERNAL MEDICINE

## 2020-02-24 PROCEDURE — 1123F ACP DISCUSS/DSCN MKR DOCD: CPT | Performed by: INTERNAL MEDICINE

## 2020-02-24 PROCEDURE — 99203 OFFICE O/P NEW LOW 30 MIN: CPT | Performed by: INTERNAL MEDICINE

## 2020-02-24 PROCEDURE — G8399 PT W/DXA RESULTS DOCUMENT: HCPCS | Performed by: INTERNAL MEDICINE

## 2020-02-24 PROCEDURE — G8417 CALC BMI ABV UP PARAM F/U: HCPCS | Performed by: INTERNAL MEDICINE

## 2020-02-24 RX ORDER — METOPROLOL SUCCINATE 50 MG/1
100 TABLET, EXTENDED RELEASE ORAL DAILY
Qty: 90 TABLET | Refills: 3
Start: 2020-02-24 | End: 2020-05-27

## 2020-03-09 ENCOUNTER — HOSPITAL ENCOUNTER (OUTPATIENT)
Dept: NON INVASIVE DIAGNOSTICS | Age: 80
Discharge: HOME | End: 2020-03-09
Payer: MEDICARE

## 2020-03-09 LAB
LV EF: 70 %
LVEF MODALITY: NORMAL

## 2020-03-09 PROCEDURE — 78452 HT MUSCLE IMAGE SPECT MULT: CPT

## 2020-03-09 PROCEDURE — 93017 CV STRESS TEST TRACING ONLY: CPT

## 2020-03-09 PROCEDURE — 6360000002 HC RX W HCPCS: Performed by: INTERNAL MEDICINE

## 2020-03-09 PROCEDURE — 3430000000 HC RX DIAGNOSTIC RADIOPHARMACEUTICAL: Performed by: INTERNAL MEDICINE

## 2020-03-09 PROCEDURE — A9502 TC99M TETROFOSMIN: HCPCS | Performed by: INTERNAL MEDICINE

## 2020-03-09 RX ADMIN — REGADENOSON 0.4 MG: 0.08 INJECTION, SOLUTION INTRAVENOUS at 10:32

## 2020-03-09 RX ADMIN — TETROFOSMIN 10 MILLICURIE: 1.38 INJECTION, POWDER, LYOPHILIZED, FOR SOLUTION INTRAVENOUS at 09:29

## 2020-03-09 RX ADMIN — TETROFOSMIN 30 MILLICURIE: 1.38 INJECTION, POWDER, LYOPHILIZED, FOR SOLUTION INTRAVENOUS at 10:36

## 2020-04-01 PROCEDURE — 93228 REMOTE 30 DAY ECG REV/REPORT: CPT | Performed by: INTERNAL MEDICINE

## 2020-04-07 NOTE — PROGRESS NOTES
2/24/20  Yes Sina Mora MD   Multiple Vitamins-Minerals (THERAPEUTIC MULTIVITAMIN-MINERALS) tablet Take 1 tablet by mouth nightly   Yes Historical Provider, MD   Omega-3 Fatty Acids (FISH OIL) 1000 MG CAPS Take 1,000 mg by mouth nightly Not sure of dosage   Yes Historical Provider, MD   vitamin E 400 UNIT capsule Take 180 Units by mouth daily   Yes Historical Provider, MD   NONFORMULARY daily Tumeric-puts 1/4 tsp in shake   Yes Historical Provider, MD   omeprazole (PRILOSEC) 20 MG delayed release capsule Take 1 capsule by mouth 2 times daily (before meals) 1/22/20  Yes Barbra Saint, MD   amLODIPine (NORVASC) 5 MG tablet TAKE 1 TABLET EVERY DAY  Patient taking differently: nightly  8/10/19  Yes Barbra Saint, MD   Glucosamine-Chondroit-Vit C-Mn (GLUCOSAMINE 1500 COMPLEX) CAPS Take 1,500 mg by mouth daily. Patient taking differently: Take 1,500 mg by mouth nightly  5/11/11  Yes Homero Beckman MD       Social History:   reports that she has never smoked. She has never used smokeless tobacco. She reports that she does not drink alcohol or use drugs. Family History:  family history includes Cancer in her father; Heart Disease in her mother. Reviewed. Denies family history of sudden cardiac death, arrhythmia, premature CAD    Review of System:    · General ROS: negative for - chills, fever   · Psychological ROS: negative for - anxiety or depression  · Ophthalmic ROS: negative for - eye pain or loss of vision  · ENT ROS: negative for - epistaxis, headaches, nasal discharge, sore throat   · Allergy and Immunology ROS: negative for - hives, nasal congestion   · Hematological and Lymphatic ROS: negative for - bleeding problems, blood clots, bruising or jaundice  · Endocrine ROS: negative for - skin changes, temperature intolerance or unexpected weight changes  · Respiratory ROS: negative for - cough, hemoptysis, pleuritic pain, SOB, sputum changes or wheezing  · Cardiovascular ROS: Per HPI. · Gastrointestinal ROS: negative for - abdominal pain, blood in stools, diarrhea, hematemesis, melena, nausea/vomiting or swallowing difficulty/pain  · Genito-Urinary ROS: negative for - dysuria or incontinence  · Musculoskeletal ROS: negative for - joint swelling or muscle pain  · Neurological ROS: negative for - confusion, dizziness, gait disturbance, headaches, numbness/tingling, seizures, speech problems, tremors, visual changes or weakness  · Dermatological ROS: negative for - rash  · Stress  form his dementia    Allergies   Allergen Reactions    Penicillins Hives    Sulfa Antibiotics Itching    Levofloxacin      \" Cannot remember\"    Cymbalta [Duloxetine Hcl] Anxiety    Lyrica [Pregabalin] Anxiety    Tramadol Nausea Only       PHYSICAL EXAMINATION:  Vital Signs: (As obtained by patient/caregiver or practitioner observation)    Vitals:    04/13/20 0927   BP: (!) 146/77   Pulse: 54       Constitutional: Unable to assess   [] Appears well-developed and well-nourished [] No apparent distress      [] Abnormal-   Mental status  [x] Alert and awake  [x] Oriented to person/place/time []Able to follow commands      Eyes: Unable to assess  EOM    []  Normal  [] Abnormal-  Sclera  []  Normal  [] Abnormal -         Discharge []  None visible  [] Abnormal -    HENT: Unable to assess  [] Normocephalic, atraumatic.   [] Abnormal   [] Mouth/Throat: Mucous membranes are moist.     External Ears Unable to assess   [] Normal  [] Abnormal-     Neck:Unable to assess   [] No visualized mass     Pulmonary/Chest:Unable to assess   [] Respiratory effort normal.    [] No visualized signs of difficulty breathing or respiratory distress   [] Abnormal-      Musculoskeletal:  Unable to assess   [] Normal gait with no signs of ataxia    [] Normal range of motion of neck    [] Abnormal-     Neurological:    Unable to assess      [] No Facial Asymmetry (Cranial nerve 7 motor function) (limited exam to video visit)         [] No gaze palsy      [] Abnormal-         Skin:  Unable to assess        [] No significant exanthematous lesions or discoloration noted on facial skin         [] Abnormal-            Psychiatric: Unable to assess      [] Normal Affect [x] No Hallucinations     [] Abnormal-     Other pertinent observable physical exam findings-     Labs:  Reviewed. ECG: reviewed most recent 2/24/2020 sinus rhythm,ectopic ventricular beat       Diagnostics:    24 hour Holter Monitor 2/12/2020  The rhythm was sinus. Average DC interval 0.18, average QRS duration 0.11 [prolonged]. Average daily heart rate 59 ranging from 48 to 87 bpm. Bradycardia present for 21% of test.2. Frequent premature supraventricular ectopic beats total 1,250 consisting of 1,035 isolated PACs, 45 atrial pairs, 21 atrial runs. The longest atrial run was 10 beats HR-102 bpm occurring at 05:12:01. The fastest atrial run was 3 beats HR-145 bpm occurring at 22:35:50.3. premature ventricular ectopic beats total 8,127 consisting of 8,111 isolated PVCs ~ 10%, 8 ventricular couplets. 3 different morphologies, at least.4. Diary returned; no symptoms reported. 30 day event monitor 2/26/2020-3/27/2020  SR and several symptomatic episodes of atrial fibrillation or flutter with v-rates 140-150 bpm confirming cause of sx    NM MYOCARDIAL STRESS TEST 9/26/2016   Summary    Pharmacological Stress/MPI Results:        1. Technically a satisfactory study.    2. Normal pharmacological stress portion of the study.    3. No evidence of Ischemia by Myocardial Perfusion Imaging.    4. Gated Study shows normal LV size and Systolic function; EF is 70 %. Nuclear Stress 3/9/2020       Summary    Pharmacological Stress/MPI Results:        1. Technically a satisfactory study.    2. Normal pharmacological stress portion of the study.    3. No evidence of Ischemia by Myocardial Perfusion Imaging.    4. Gated Study shows normal LV size and Systolic function; EF is 70 %.       ECG Findings  No ischemic ST changes.        Arrhythmias    Premature ventricular contractions. ASSESSMENT:    Atrial fibrillation  She denied having and episodes during the time she wore her 24 hour monitor. 30 day monitor worn from 2/26/2020-3-, which showed SR and several episodes of atrial fibrillation with v-rates 140-150 bpm when symptoamtic. She reports that she had episodes were she felt her heart was racing. She states that she is waking in the middle of night with heart racing. She reports that her blood pressure becomes elevated with episodes 180-200 SBP which it does at other times when stressed. She reports BP becomes elevated with stress as well. On Toprol  mg daily for rate control of atrial fibrillation. She has a [id number] Score 4 (HTN, AGE, female gender)  I discussed oral anticoagulation to decrease the risk of thromboembolic events including stroke. Benefits and alternatives were discussed with patient. Risk of bleeding was discussed. Patient verbalized understanding and opted to start Xarelto 20 mg daily for thromboembolic risk reduction  Instructed to take with largest meal of the day. Essential hypertension  Stable  Managed by PCP Pricila Olivarez MD  Reports -130 DBP 60-70's. Continue amlodipine to 5 mg daily. Instructed to check BP for the next week. Start Cardizem  mg daily. If SBP less than 110 take 1/2 of a 5 mg tablet or stop    History Chest pressure  Pharmacological stress test to evaluate for ischemia was completed on 3/9/2020 and showed no evidence of myocardial ischemia. Possible related to stress of caring her her elderly  with Alzheimer. Plan:  1) Start Xarelto 20 mg daily  2) Start Cardizem 240 mg daily. 3) Amlodipine to 5 mg daily. 4) Instructed to call office with update in 10 days. 5) Follow up in 6 months. Thank you for allowing me to participate in the care of Venice Paulino.  All questions and concerns were

## 2020-04-13 ENCOUNTER — OFFICE VISIT (OUTPATIENT)
Dept: CARDIOLOGY CLINIC | Age: 80
End: 2020-04-13
Payer: MEDICARE

## 2020-04-13 VITALS
HEIGHT: 67 IN | DIASTOLIC BLOOD PRESSURE: 77 MMHG | WEIGHT: 181 LBS | HEART RATE: 54 BPM | SYSTOLIC BLOOD PRESSURE: 146 MMHG | BODY MASS INDEX: 28.41 KG/M2

## 2020-04-13 PROCEDURE — G8399 PT W/DXA RESULTS DOCUMENT: HCPCS | Performed by: INTERNAL MEDICINE

## 2020-04-13 PROCEDURE — 1123F ACP DISCUSS/DSCN MKR DOCD: CPT | Performed by: INTERNAL MEDICINE

## 2020-04-13 PROCEDURE — G8417 CALC BMI ABV UP PARAM F/U: HCPCS | Performed by: INTERNAL MEDICINE

## 2020-04-13 PROCEDURE — 1036F TOBACCO NON-USER: CPT | Performed by: INTERNAL MEDICINE

## 2020-04-13 PROCEDURE — G8427 DOCREV CUR MEDS BY ELIG CLIN: HCPCS | Performed by: INTERNAL MEDICINE

## 2020-04-13 PROCEDURE — 1090F PRES/ABSN URINE INCON ASSESS: CPT | Performed by: INTERNAL MEDICINE

## 2020-04-13 PROCEDURE — 99214 OFFICE O/P EST MOD 30 MIN: CPT | Performed by: INTERNAL MEDICINE

## 2020-04-13 PROCEDURE — 4040F PNEUMOC VAC/ADMIN/RCVD: CPT | Performed by: INTERNAL MEDICINE

## 2020-04-13 RX ORDER — DILTIAZEM HYDROCHLORIDE 240 MG/1
240 CAPSULE, COATED, EXTENDED RELEASE ORAL DAILY
Qty: 30 CAPSULE | Refills: 3 | Status: SHIPPED | OUTPATIENT
Start: 2020-04-13 | End: 2020-12-23

## 2020-07-07 NOTE — PROGRESS NOTES
NOTE - this visit conducted via telephone  Visit initiated at patient or caregiver request with permission to bill to insurer granted. Consent:  He and/or health care decision maker is aware that that he may receive a bill for this telephone service, depending on his insurance coverage, and has provided verbal consent to proceed: Yes      Documentation:  I communicated with the patient and/or health care decision maker about see below. Details of this discussion including any medical advice provided: see below      I affirm this is a Patient Initiated Episode with a Patient who has not had a related appointment within my department in the past 7 days or scheduled within the next 24 hours. Patient identification was verified at the start of the visit: YesTotal Time: minutes: 11-20 minutes    Note: not billable if this call serves to triage the patient into an appointment for the relevant concern      HPI: Taylor Maher presents for evaluation and management of arrhythmia      Chronic health issues include obesity, mixed urinary incontinence, osteoarthritis, lumbar laminectomy, hypertension, prediabetes, hyperlipidemia. Atrophic bladder. Intermittent afib/flutter noted on evaluation for palpitations and smothering episodes. . Thallium 3.2020 ER 70 % no ischemia, Holter 2.2020 + ectopy with afib flutter. prescribed xarelto, diltiazem 240. Did not fill. This episode decreased in frequency. States she is able to just.  Her  dementia and is nondistressed. Swimming 2-3 times weekly. Weight is down another pound. Blood pressures less than 110. No syncope or presyncope. A1c 6.3 February 2020. Taking amlodipine 5 and metoprolol 50 prediabetes, elevated ldl, secondary smoke,      Breast cyst.  Follow back up in May 2020.       Mixed urinary incontinence. Has tried [employer] exercises. Positive prediabetes at 6.3. Has lost 9 pounds.   Is aware of the pelvic floor clinic       Lumbar laminectomy with lower extremity paresthesias and spinal stimulator Tylenol. Knees and hips are doing well. Paresthesias lower extremities. No falls.       , no FH ovarian, breast. No abnormal Pap smears amenorrheic. Urinary incontinence. Sexually active. due mammogram    Neg occult 2.     SCC . eyebrow successfully removed. PMH    Total hip replacement x 2\     Lumbar laminectomy     Cataract, breast bx     SH pool 60 min twice weekly.  poor health positive dementia. Not aggressive. Forgets how to make coffee. Has been putting items in the wrong places. Has seen neurology. Is on Aricept but not Namenda no recent follow-up. No tobacco. 3-4 yearly. Son   from fall at [place]. 2 grandchildren      FH: +heart failure, prostate cancer, heart disease, OA          - ovarin, breast cancer, mental illness     Review of systems: Up-to-date on eye exams. Delinquent dental exams. Occasional wheeze. No breathing issues today. Denies any chest pain palpitations or increased lower extremity edema. Rare GE reflux. No bloody stools or change in stools. Told she needs no more colonoscopies. Urinary incontinence. No vaginal complaints. Contact dermatitis perineum. Osteoarthritis.  Skin cancers with routine follow-up with dermatology    Constitutional, ent, CV, respiratory, GI, , joint, skin, allergic and psychiatric ROS reviewed and negative except for above    Allergies   Allergen Reactions    Penicillins Hives    Sulfa Antibiotics Itching    Levofloxacin      \" Cannot remember\"    Cymbalta [Duloxetine Hcl] Anxiety    Lyrica [Pregabalin] Anxiety    Tramadol Nausea Only       Outpatient Medications Marked as Taking for the 20 encounter (Virtual Visit) with Dio Hernandez MD   Medication Sig Dispense Refill    amLODIPine (NORVASC) 5 MG tablet 1 po q 24 hours 90 tablet 0    metoprolol succinate (TOPROL XL) 50 MG extended release tablet TAKE 1 TABLET EVERY DAY 90 tablet 0    Multiple Vitamins-Minerals (THERAPEUTIC MULTIVITAMIN-MINERALS) tablet Take 1 tablet by mouth nightly      Omega-3 Fatty Acids (FISH OIL) 1000 MG CAPS Take 1,000 mg by mouth nightly Not sure of dosage      vitamin E 400 UNIT capsule Take 180 Units by mouth daily               Past Medical History:   Diagnosis Date    Allergic rhinitis     Arrhythmia 2/21/2020 2.2020 The rhythm was sinus. Average WA interval 0.18, average QRS duration 0.11 [prolonged]. Average daily heart rate 59 ranging from 48 to 87 bpm.Bradycardia present for 21% of test.2. Frequent premature supraventricular ectopic beats total 1,250 consistingof 1,035 isolated PACs, 45 atrial pairs, 21 atrial runs. The longest atrial run was 10 beats HR-102 bpm occurring at 05:12:01. The fastest atrial    Breast mass in female 4/30/2012    Cataract 4/17/2012    Chest pain 4/29/13    normal Lexiscan [facility] [place]: Dr. Kurt Wolf    Closed fracture of first metatarsal bone     Dr. Amelia Avina HTN (hypertension)     Hyperlipidemia     LLQ abdominal pain 4/5/2012    Migraine     stopped with menopause    Osteoarthritis     Osteopenia 4/17/2018    Pneumonia 5/3/2012    Spinal stenosis     Patrice Corrales Ventricular ectopy 2/21/2020 2.2020 The rhythm was sinus. Average WA interval 0.18, average QRS duration 0.11 [prolonged]. Average daily heart rate 59 ranging from 48 to 87 bpm.Bradycardia present for 21% of test.2. Frequent premature supraventricular ectopic beats total 1,250 consistingof 1,035 isolated PACs, 45 atrial pairs, 21 atrial runs. The longest atrial run was 10 beats HR-102 bpm occurring at 05:12:01. The fastest atrial       Past Surgical History:   Procedure Laterality Date    BACK SURGERY Left 1/29/2020    EXCISION BACK AND LEFT EYEBROW LESIONS, CURETTAGE FOREHEAD LESION, INTERMEDIATE LAYERED REPAIR OF INCISIONS, CURETTAGE FOREHEAD LESION performed by Lupe Cotton MD at [place] [address] Right     bx    CATARACT REMOVAL Bilateral 2012    Dr. Corie Jean      Dr. Jac Sahu  2006    Spinal stimulator    TOTAL HIP ARTHROPLASTY  11    Left Dr. Luanne Moreira at [facility]  12    Right Dr. Luanne Moreira             Family History   Problem Relation Age of Onset    Heart Disease Mother          at 80 heart failure    Cancer Father          at 76 prostate CA         Review of Systems      Chemistry        Component Value Date/Time     2020 1513    K 4.2 2020 1513    CL 98 (L) 2020 1513    CO2 24 2020 1513    BUN 18 2020 1513    CREATININE 0.8 2020 1513        Component Value Date/Time    CALCIUM 9.4 2020 1513    ALKPHOS 81 2020 1513    AST 15 2020 1513    ALT 11 2020 1513    BILITOT <0.2 2020 1513    BILITOT 0.5 2011            NO  VS as this was a virtual visit during cvod19 pandemic    Lab Results   Component Value Date    WBC 6.2 2020    HGB 13.2 2020    HCT 40.0 2020    MCV 91.5 2020     2020     Lab Results   Component Value Date    LABA1C 6.3 2020     Lab Results   Component Value Date    .0 2019     Lab Results   Component Value Date    LABA1C 6.3 2020     No components found for: CHLPL  Lab Results   Component Value Date    TRIG 295 (H) 2020    TRIG 287 (H) 2019    TRIG 169 (H) 03/10/2017     Lab Results   Component Value Date    HDL 49 2020    HDL 49 2019    HDL 51 03/10/2017     Lab Results   Component Value Date    LDLCALC 91 2020    LDLCALC 121 (H) 2019    LDLCALC 126 (H) 03/10/2017     Lab Results   Component Value Date    LABVLDL 59 2020    LABVLDL 57 2019    LABVLDL 34 03/10/2017       Old labs and records reviewed or requested  Discussed past lab and studies with patient      Diagnosis Orders   1. Cardiac arrhythmia, unspecified cardiac arrhythmia type     2.  Breast cancer screening by mammogram  AMIRA DIGITAL SCREEN W CAD BILATERAL   3. SCC (squamous cell carcinoma)     4. Prediabetes     5. Osteopenia, unspecified location     6. Primary osteoarthritis, unspecified site     7. Mixed stress and urge urinary incontinence     8. Mixed hyperlipidemia     9. Essential hypertension, benign     10. Cyst of breast, unspecified laterality       Cardiac arrhythmia. Reluctant to use medication. Discussed benefits and risks. Patient will discuss 5 mL. History squamous cell carcinoma with extension. No follow-up. Prediabetes. Weight is down. Repeat A1c in the future. Osteopenia with borderline low vitamin D.  Vitamin D next visit. Hyperlipidemia. Good level last check. Breast cyst will do mammogram mammogram ordered. Stressors is doing better with these        No follow-ups on file. Diagnosis and treatment discussed.   Possible side effects of medication reviewed  Patients questions answered  Follow up understood  Pt aware if they are not contacted about any test results , this does not mean they are normal.  They should call

## 2020-07-08 ENCOUNTER — TELEPHONE (OUTPATIENT)
Dept: CARDIOLOGY CLINIC | Age: 80
End: 2020-07-08

## 2020-07-08 ENCOUNTER — TELEPHONE (OUTPATIENT)
Dept: FAMILY MEDICINE CLINIC | Age: 80
End: 2020-07-08

## 2020-07-08 ENCOUNTER — VIRTUAL VISIT (OUTPATIENT)
Dept: FAMILY MEDICINE CLINIC | Age: 80
End: 2020-07-08
Payer: MEDICARE

## 2020-07-08 PROBLEM — I49.9 ARRHYTHMIA: Status: ACTIVE | Noted: 2020-02-21

## 2020-07-08 PROCEDURE — 99213 OFFICE O/P EST LOW 20 MIN: CPT | Performed by: INTERNAL MEDICINE

## 2020-07-08 NOTE — TELEPHONE ENCOUNTER
Dr Laya Shelton' office called stating that Dr Laya Shelton would like to speak to Dr Mckinley Urbano directly regarding this pt. I let their office know that Dr Mckinley Urbano is out of the office this week and next. Dr Laya Shelton stated to have him call her when he gets back into the office, she does not want to speak to anyone but him.       Dr Mckinley Urbano can reach the office at [phone]

## 2020-07-09 ENCOUNTER — NURSE ONLY (OUTPATIENT)
Dept: FAMILY MEDICINE CLINIC | Age: 80
End: 2020-07-09
Payer: MEDICARE

## 2020-07-09 LAB
ANION GAP SERPL CALCULATED.3IONS-SCNC: 13 MMOL/L (ref 3–16)
BUN BLDV-MCNC: 17 MG/DL (ref 7–20)
CALCIUM SERPL-MCNC: 9.2 MG/DL (ref 8.3–10.6)
CHLORIDE BLD-SCNC: 101 MMOL/L (ref 99–110)
CO2: 26 MMOL/L (ref 21–32)
CREAT SERPL-MCNC: 0.8 MG/DL (ref 0.6–1.2)
GFR AFRICAN AMERICAN: >60
GFR NON-AFRICAN AMERICAN: >60
GLUCOSE BLD-MCNC: 129 MG/DL (ref 70–99)
POTASSIUM SERPL-SCNC: 4.4 MMOL/L (ref 3.5–5.1)
SODIUM BLD-SCNC: 140 MMOL/L (ref 136–145)
VITAMIN D 25-HYDROXY: 35.7 NG/ML

## 2020-07-09 PROCEDURE — 36415 COLL VENOUS BLD VENIPUNCTURE: CPT | Performed by: INTERNAL MEDICINE

## 2020-07-10 LAB
ESTIMATED AVERAGE GLUCOSE: 128.4 MG/DL
HBA1C MFR BLD: 6.1 %

## 2020-07-24 ENCOUNTER — TELEPHONE (OUTPATIENT)
Dept: FAMILY MEDICINE CLINIC | Age: 80
End: 2020-07-24

## 2020-07-24 NOTE — TELEPHONE ENCOUNTER
Just saw message today  Called office on day off and left cell phone number. Dr. Bijal Palacio is not officially working today either.

## 2020-07-31 RX ORDER — AMLODIPINE BESYLATE 5 MG/1
TABLET ORAL
Qty: 90 TABLET | Refills: 0 | Status: SHIPPED | OUTPATIENT
Start: 2020-07-31 | End: 2020-10-14

## 2020-07-31 RX ORDER — METOPROLOL SUCCINATE 50 MG/1
TABLET, EXTENDED RELEASE ORAL
Qty: 90 TABLET | Refills: 0 | Status: SHIPPED | OUTPATIENT
Start: 2020-07-31 | End: 2020-10-14

## 2020-08-26 ENCOUNTER — TELEPHONE (OUTPATIENT)
Dept: FAMILY MEDICINE CLINIC | Age: 80
End: 2020-08-26

## 2020-08-26 NOTE — TELEPHONE ENCOUNTER
Received appointment request from the call center. PT is wanting to schedule her annual physical and/or pap smear with Dr. Cinda Hashimoto. There are no afternoon 40 min availabilities for another month. I didn't want to use a morning spot since Dr. Cinda Hashimoto likes those for VV's unless stated otherwise. Please give PT a call back when she is able to be seen: [phone]. Subject: Appointment Request     Reason for Call: Routine Physical Exam with PAP     QUESTIONS   Type of Appointment? Established Patient   Reason for appointment request? No appointments available during search   Additional Information for Provider? Patient called needing an annual   visit but there is no availability. Also needs prescription for mammogram.   ---------------------------------------------------------------------------   --------------   CALL BACK INFO   What is the best way for the office to contact you? OK to leave message on   voicemail   Preferred Call Back Phone Number? [phone]   ---------------------------------------------------------------------------   --------------   SCRIPT ANSWERS   Relationship to Patient? Self   Appointment reason? Well Care/Follow Ups   Select a Well Care/Follow Ups appointment reason? Adult Physical Exam   [Medicare Annual Wellness    AWV    PAP    Pelvic]   (Is the patient requesting to be seen urgently for their symptoms?)? No   (If the patient is female   Noe Kam this question) Are you requesting a pap smear with your physical   exam? Yes   (Patient is Medicare and requesting Annual Wellness Visit)? Yes   Have you been diagnosed with    tested for    or told that you are suspected of having COVID-19 (Coronavirus)? No   Have you had a fever or taken medication to treat a fever within the past   3 days? No   Have you had a cough    shortness of breath or flu-like symptoms within the past 3 days?  No   Do you currently have flu-like symptoms including fever or chills    cough    shortness of breath

## 2020-08-28 NOTE — TELEPHONE ENCOUNTER
Did not schedule patient yet due to lack of availability of 40 mins openings/or did not want to use a morning VV opening. Awaiting Dr. Brandan Edwards to return to decide where to schedule patient then a call will be made at to get them scheduled with an appointment.

## 2020-09-24 ENCOUNTER — HOSPITAL ENCOUNTER (OUTPATIENT)
Dept: WOMENS IMAGING | Age: 80
Discharge: HOME | End: 2020-09-24
Payer: MEDICARE

## 2020-09-24 PROCEDURE — 77067 SCR MAMMO BI INCL CAD: CPT

## 2020-09-30 ENCOUNTER — TELEPHONE (OUTPATIENT)
Dept: FAMILY MEDICINE CLINIC | Age: 80
End: 2020-09-30

## 2020-10-07 NOTE — PROGRESS NOTES
History and Physical      Sonia Joseph  YOB: 1940    Date of Service:  10/8/2020    Chief Complaint:   Sonia Joseph is a 78 y.o. female who presents for complete physical examination. Chronic health issues include obesity, mixed urinary incontinence, osteoarthritis, lumbar laminectomy, hypertension, prediabetes, hyperlipidemia. intermittent a fib flutter,  with dementia. Increased stressors. Takes care of her  with severe dementia. Gets out about twice a week for water aerobics. Getting ready to ask son to care for him or  on aging. Has been has difficulty at night especially. Falls around the house. Looks to see where she is. Does not recognize her. Thinks his mother is still alive. Hypertension. Intermittent elevation of blood pressure with intermittent A. fib rheumatoid shin flutter. Follows with cardiology. Event monitor with several episodes with rates of 140-150. Nonischemic thallium with normal EF in 2016. Was recommended to start Xarelto which she did not. Is currently on Cardizem and amlodipine. Cough with lisinopril. Aspirin daily. No known apnea. Increased stressors. 2 times over the last month she has had 1 to 1-1/2 hours of palpitations states this occurs when she has difficulty with her demented has been having severe sundowning. No alcohol. Episodes happen in the evening. Positive prediabetes, secondary smoke, mildly elevated LDL.    3 GE reflux no longer having symptoms. Has discontinued her PPI.        Lumbar laminectomy with lower extremity paresthesias and spinal stimulator Tylenol. Knees and hips are doing well. Some paresthesias. Is careful not to fall. Urinary incontinence.     Has been with Alzheimer's. Occasionally does not recognize her. Has sundowning events      , no FH ovarian, breast. No abnormal Pap smears amenorrheic. Urinary incontinence.   Sexually active.     PMH    Total hip replacement x 2     Lumbar laminectomy          pool 60 min twice weekly.  poor health positive dementia. Not aggressive. Forgets how to make coffee turn on the TV. Follows around the house. Ricardo Vargas with neurology. . Son  5 from fall at [facility]. 2 grandchildren      FH: +heart failure, prostate cancer, heart disease, OA          - ovarin, breast cancer, mental illness     Review of systems: Up-to-date on eye exams. Delinquent dental exams. . No recurrence. Occasional wheeze. No breathing issues today. Denies any chest pain positive palpitations. . Rare GE reflux. No bloody stools or change in stools. Told she needs no more colonoscopies. Urinary incontinence. No vaginal complaints. Contact dermatitis perineum. Osteoarthritis.  Skin cancers with routine follow-up with dermatology      Wt Readings from Last 3 Encounters:   10/08/20 182 lb (82.6 kg)   20 181 lb (82.1 kg)   20 185 lb (83.9 kg)     BP Readings from Last 3 Encounters:   10/08/20 (!) 175/83   20 (!) 146/77   20 138/60       Patient Active Problem List   Diagnosis    Spinal stenosis    Osteoarthritis    Allergic rhinitis    Hyperlipidemia    Closed fracture of first metatarsal bone    Breast cyst    History of pneumonia    Essential hypertension, benign    Gastroesophageal reflux disease without esophagitis    Osteopenia    Prediabetes    Mixed stress and urge urinary incontinence    SCC (squamous cell carcinoma)    Arrhythmia       Preventive Care:  Health Maintenance   Topic Date Due    DTaP/Tdap/Td vaccine (1 - Tdap) 1957    Annual Wellness Visit (AWV)  2019    Potassium monitoring  2021    Creatinine monitoring  2021    DEXA (modify frequency per FRAX score)  Completed    Flu vaccine  Completed    Shingles Vaccine  Completed    Pneumococcal 65+ years Vaccine  Completed    Hepatitis A vaccine  Aged Out    Hepatitis B vaccine  Aged Out    Hib vaccine  Aged C/ Chris Todd 17 Medical History:   Diagnosis Date    Allergic rhinitis     Arrhythmia 2/21/2020 2.2020 The rhythm was sinus. Average RI interval 0.18, average QRS duration 0.11 [prolonged]. Average daily heart rate 59 ranging from 48 to 87 bpm.Bradycardia present for 21% of test.2. Frequent premature supraventricular ectopic beats total 1,250 consistingof 1,035 isolated PACs, 45 atrial pairs, 21 atrial runs. The longest atrial run was 10 beats HR-102 bpm occurring at 05:12:01. The fastest atrial    Breast mass in female 4/30/2012    Cataract 4/17/2012    Chest pain 4/29/13    normal Lexiscan at [facility] [place]: Dr. Elias Watkins    Closed fracture of first metatarsal bone     Dr. Ana Lai HTN (hypertension)     Hyperlipidemia     LLQ abdominal pain 4/5/2012    Migraine     stopped with menopause    Osteoarthritis     Osteopenia 4/17/2018    Pneumonia 5/3/2012    Spinal stenosis     Dr. Shyam Issa,    Dossie Raquel Ventricular ectopy 2/21/2020 2.2020 The rhythm was sinus. Average RI interval 0.18, average QRS duration 0.11 [prolonged]. Average daily heart rate 59 ranging from 48 to 87 bpm.Bradycardia present for 21% of test.2. Frequent premature supraventricular ectopic beats total 1,250 consistingof 1,035 isolated PACs, 45 atrial pairs, 21 atrial runs. The longest atrial run was 10 beats HR-102 bpm occurring at 05:12:01. The fastest atrial     Past Surgical History:   Procedure Laterality Date    BACK SURGERY Left 1/29/2020    EXCISION BACK AND LEFT EYEBROW LESIONS, CURETTAGE FOREHEAD LESION, INTERMEDIATE LAYERED REPAIR OF INCISIONS, CURETTAGE FOREHEAD LESION performed by Melissa Heath MD at [facility] Right     bx    CATARACT REMOVAL Bilateral 04/26/2012    Dr. Douglas Aparicio  2004    Dr. Kassie Franz  2006    Spinal stimulator    TOTAL HIP ARTHROPLASTY  11/30/11    [facility]  [facility]    Right Dr. Yuly Ritchie     Family History   Problem Relation Age stable. Continue Tylenol. Prediabetes. Improved last visit. History squamous cell carcinoma. Osteopenia with height loss. Continue her exercise vitamin D supplementation. GE reflux improved.   Using PPI on a as needed basis now

## 2020-10-08 ENCOUNTER — OFFICE VISIT (OUTPATIENT)
Dept: FAMILY MEDICINE CLINIC | Age: 80
End: 2020-10-08
Payer: MEDICARE

## 2020-10-08 VITALS
RESPIRATION RATE: 16 BRPM | HEART RATE: 54 BPM | SYSTOLIC BLOOD PRESSURE: 175 MMHG | WEIGHT: 182 LBS | HEIGHT: 65 IN | OXYGEN SATURATION: 98 % | BODY MASS INDEX: 30.32 KG/M2 | DIASTOLIC BLOOD PRESSURE: 83 MMHG

## 2020-10-08 LAB
A/G RATIO: 1.6 (ref 1.1–2.2)
ALBUMIN SERPL-MCNC: 4.2 G/DL (ref 3.4–5)
ALP BLD-CCNC: 93 U/L (ref 40–129)
ALT SERPL-CCNC: 12 U/L (ref 10–40)
AMYLASE: 108 U/L (ref 25–115)
ANION GAP SERPL CALCULATED.3IONS-SCNC: 14 MMOL/L (ref 3–16)
AST SERPL-CCNC: 19 U/L (ref 15–37)
BILIRUB SERPL-MCNC: 0.3 MG/DL (ref 0–1)
BUN BLDV-MCNC: 19 MG/DL (ref 7–20)
CALCIUM SERPL-MCNC: 9.7 MG/DL (ref 8.3–10.6)
CHLORIDE BLD-SCNC: 100 MMOL/L (ref 99–110)
CO2: 25 MMOL/L (ref 21–32)
CREAT SERPL-MCNC: 0.8 MG/DL (ref 0.6–1.2)
GFR AFRICAN AMERICAN: >60
GFR NON-AFRICAN AMERICAN: >60
GLOBULIN: 2.6 G/DL
GLUCOSE BLD-MCNC: 106 MG/DL (ref 70–99)
POTASSIUM SERPL-SCNC: 4.5 MMOL/L (ref 3.5–5.1)
SODIUM BLD-SCNC: 139 MMOL/L (ref 136–145)
TOTAL PROTEIN: 6.8 G/DL (ref 6.4–8.2)

## 2020-10-08 PROCEDURE — 90694 VACC AIIV4 NO PRSRV 0.5ML IM: CPT | Performed by: INTERNAL MEDICINE

## 2020-10-08 PROCEDURE — G8484 FLU IMMUNIZE NO ADMIN: HCPCS | Performed by: INTERNAL MEDICINE

## 2020-10-08 PROCEDURE — 36415 COLL VENOUS BLD VENIPUNCTURE: CPT | Performed by: INTERNAL MEDICINE

## 2020-10-08 PROCEDURE — 99397 PER PM REEVAL EST PAT 65+ YR: CPT | Performed by: INTERNAL MEDICINE

## 2020-10-08 PROCEDURE — G0008 ADMIN INFLUENZA VIRUS VAC: HCPCS | Performed by: INTERNAL MEDICINE

## 2020-10-08 RX ORDER — LOSARTAN POTASSIUM 25 MG/1
TABLET ORAL
Qty: 30 TABLET | Refills: 0 | Status: SHIPPED | OUTPATIENT
Start: 2020-10-08 | End: 2020-12-23

## 2020-10-08 RX ORDER — BUSPIRONE HYDROCHLORIDE 5 MG/1
TABLET ORAL
Qty: 30 TABLET | Refills: 0 | Status: SHIPPED | OUTPATIENT
Start: 2020-10-08 | End: 2020-12-07

## 2020-10-14 RX ORDER — METOPROLOL SUCCINATE 50 MG/1
TABLET, EXTENDED RELEASE ORAL
Qty: 90 TABLET | Refills: 0 | Status: SHIPPED | OUTPATIENT
Start: 2020-10-14 | End: 2020-12-18

## 2020-10-14 RX ORDER — AMLODIPINE BESYLATE 5 MG/1
TABLET ORAL
Qty: 90 TABLET | Refills: 0 | Status: SHIPPED | OUTPATIENT
Start: 2020-10-14 | End: 2020-12-18

## 2020-12-07 RX ORDER — BUSPIRONE HYDROCHLORIDE 5 MG/1
TABLET ORAL
Qty: 30 TABLET | Refills: 0 | Status: SHIPPED | OUTPATIENT
Start: 2020-12-07 | End: 2022-09-21 | Stop reason: SDUPTHER

## 2020-12-17 NOTE — TELEPHONE ENCOUNTER
Pt needs BP check in office. Bring cuff. Can bridge if needed    What medication is she currently taking for BP. Looks  Like she is out of losartan.  Was to use 25 and taper up for BP

## 2020-12-18 RX ORDER — AMLODIPINE BESYLATE 5 MG/1
TABLET ORAL
Qty: 90 TABLET | Refills: 0 | Status: SHIPPED | OUTPATIENT
Start: 2020-12-18 | End: 2021-02-22

## 2020-12-18 RX ORDER — METOPROLOL SUCCINATE 50 MG/1
TABLET, EXTENDED RELEASE ORAL
Qty: 90 TABLET | Refills: 0 | Status: SHIPPED | OUTPATIENT
Start: 2020-12-18 | End: 2021-02-22

## 2020-12-18 NOTE — TELEPHONE ENCOUNTER
Pt is currently taking Metoprolol 50 mg QD and Amlodipine 5 mg QD. Pt scheduled next week for BP check. Pt aware to bring her BP cuff from home.

## 2020-12-23 ENCOUNTER — TELEPHONE (OUTPATIENT)
Dept: FAMILY MEDICINE CLINIC | Age: 80
End: 2020-12-23

## 2020-12-23 ENCOUNTER — NURSE ONLY (OUTPATIENT)
Dept: FAMILY MEDICINE CLINIC | Age: 80
End: 2020-12-23

## 2020-12-23 VITALS — SYSTOLIC BLOOD PRESSURE: 142 MMHG | DIASTOLIC BLOOD PRESSURE: 78 MMHG

## 2020-12-23 NOTE — TELEPHONE ENCOUNTER
Pt in today for BP check and to compare readings with her cuff from home. Pt's cuff seems to be pretty accurate. Please review readings and advise.

## 2020-12-23 NOTE — TELEPHONE ENCOUNTER
tenzin Robles is returning your call. She states she is now home, and would like to speak directly with you.

## 2020-12-23 NOTE — TELEPHONE ENCOUNTER
So no longer on diltiazem and losartan as well? Goal bp slightly lower.   If not less than 140/90 most of the time would recommend restartin losartan 25

## 2020-12-23 NOTE — TELEPHONE ENCOUNTER
She has been steadily checking at home and numbers are 130's over 60's-70's. Today when she was checked she says it was a little higher than at home. Confirmed she is only taking amlodipine, metoprolol, and buspar.

## 2021-02-19 DIAGNOSIS — I10 ESSENTIAL HYPERTENSION, BENIGN: ICD-10-CM

## 2021-02-22 RX ORDER — AMLODIPINE BESYLATE 5 MG/1
TABLET ORAL
Qty: 90 TABLET | Refills: 0 | Status: SHIPPED | OUTPATIENT
Start: 2021-02-22 | End: 2021-04-26

## 2021-02-22 RX ORDER — METOPROLOL SUCCINATE 50 MG/1
TABLET, EXTENDED RELEASE ORAL
Qty: 90 TABLET | Refills: 0 | Status: SHIPPED | OUTPATIENT
Start: 2021-02-22 | End: 2021-04-26

## 2021-02-22 NOTE — TELEPHONE ENCOUNTER
Follow up march. Let her know chilo batista psychiatric nurse, will see her .  Please assist with scheduling this

## 2021-03-29 ENCOUNTER — OFFICE VISIT (OUTPATIENT)
Dept: FAMILY MEDICINE CLINIC | Age: 81
End: 2021-03-29
Payer: MEDICARE

## 2021-03-29 VITALS
BODY MASS INDEX: 28.75 KG/M2 | WEIGHT: 183.2 LBS | OXYGEN SATURATION: 98 % | TEMPERATURE: 97.3 F | DIASTOLIC BLOOD PRESSURE: 78 MMHG | SYSTOLIC BLOOD PRESSURE: 130 MMHG | HEIGHT: 67 IN | HEART RATE: 63 BPM

## 2021-03-29 DIAGNOSIS — I10 ESSENTIAL HYPERTENSION, BENIGN: Primary | ICD-10-CM

## 2021-03-29 DIAGNOSIS — R73.03 PREDIABETES: ICD-10-CM

## 2021-03-29 DIAGNOSIS — C44.92 SCC (SQUAMOUS CELL CARCINOMA): ICD-10-CM

## 2021-03-29 DIAGNOSIS — M85.80 OSTEOPENIA, UNSPECIFIED LOCATION: ICD-10-CM

## 2021-03-29 DIAGNOSIS — I48.0 PAROXYSMAL ATRIAL FIBRILLATION (HCC): ICD-10-CM

## 2021-03-29 DIAGNOSIS — M19.91 PRIMARY OSTEOARTHRITIS, UNSPECIFIED SITE: ICD-10-CM

## 2021-03-29 LAB
ANION GAP SERPL CALCULATED.3IONS-SCNC: 10 MMOL/L (ref 3–16)
BUN BLDV-MCNC: 19 MG/DL (ref 7–20)
CALCIUM SERPL-MCNC: 9.2 MG/DL (ref 8.3–10.6)
CHLORIDE BLD-SCNC: 102 MMOL/L (ref 99–110)
CHOLESTEROL, TOTAL: 204 MG/DL (ref 0–199)
CO2: 28 MMOL/L (ref 21–32)
CREAT SERPL-MCNC: 0.7 MG/DL (ref 0.6–1.2)
GFR AFRICAN AMERICAN: >60
GFR NON-AFRICAN AMERICAN: >60
GLUCOSE BLD-MCNC: 102 MG/DL (ref 70–99)
HDLC SERPL-MCNC: 44 MG/DL (ref 40–60)
LDL CHOLESTEROL CALCULATED: 114 MG/DL
POTASSIUM SERPL-SCNC: 5.2 MMOL/L (ref 3.5–5.1)
SODIUM BLD-SCNC: 140 MMOL/L (ref 136–145)
TRIGL SERPL-MCNC: 231 MG/DL (ref 0–150)
VLDLC SERPL CALC-MCNC: 46 MG/DL

## 2021-03-29 PROCEDURE — 1090F PRES/ABSN URINE INCON ASSESS: CPT | Performed by: INTERNAL MEDICINE

## 2021-03-29 PROCEDURE — 4040F PNEUMOC VAC/ADMIN/RCVD: CPT | Performed by: INTERNAL MEDICINE

## 2021-03-29 PROCEDURE — 1036F TOBACCO NON-USER: CPT | Performed by: INTERNAL MEDICINE

## 2021-03-29 PROCEDURE — 99214 OFFICE O/P EST MOD 30 MIN: CPT | Performed by: INTERNAL MEDICINE

## 2021-03-29 PROCEDURE — G8417 CALC BMI ABV UP PARAM F/U: HCPCS | Performed by: INTERNAL MEDICINE

## 2021-03-29 PROCEDURE — G8484 FLU IMMUNIZE NO ADMIN: HCPCS | Performed by: INTERNAL MEDICINE

## 2021-03-29 PROCEDURE — 1123F ACP DISCUSS/DSCN MKR DOCD: CPT | Performed by: INTERNAL MEDICINE

## 2021-03-29 PROCEDURE — 36415 COLL VENOUS BLD VENIPUNCTURE: CPT | Performed by: INTERNAL MEDICINE

## 2021-03-29 PROCEDURE — G8428 CUR MEDS NOT DOCUMENT: HCPCS | Performed by: INTERNAL MEDICINE

## 2021-03-29 PROCEDURE — G8399 PT W/DXA RESULTS DOCUMENT: HCPCS | Performed by: INTERNAL MEDICINE

## 2021-03-29 NOTE — PATIENT INSTRUCTIONS
Stay at one pill amlodipine daily  Follow back up with your cuff in about 1 to 2 weeks for repeat blood pressure check. One side effect may be some increasing lower extremity edema. Start a baby aspirin daily. I look forward to getting back to swimming again. Tdap booster    Patient Education        DASH Diet: Care Instructions  Your Care Instructions     The DASH diet is an eating plan that can help lower your blood pressure. DASH stands for Dietary Approaches to Stop Hypertension. Hypertension is high blood pressure. The DASH diet focuses on eating foods that are high in calcium, potassium, and magnesium. These nutrients can lower blood pressure. The foods that are highest in these nutrients are fruits, vegetables, low-fat dairy products, nuts, seeds, and legumes. But taking calcium, potassium, and magnesium supplements instead of eating foods that are high in those nutrients does not have the same effect. The DASH diet also includes whole grains, fish, and poultry. The DASH diet is one of several lifestyle changes your doctor may recommend to lower your high blood pressure. Your doctor may also want you to decrease the amount of sodium in your diet. Lowering sodium while following the DASH diet can lower blood pressure even further than just the DASH diet alone. Follow-up care is a key part of your treatment and safety. Be sure to make and go to all appointments, and call your doctor if you are having problems. It's also a good idea to know your test results and keep a list of the medicines you take. How can you care for yourself at home? Following the DASH diet  · Eat 4 to 5 servings of fruit each day. A serving is 1 medium-sized piece of fruit, ½ cup chopped or canned fruit, 1/4 cup dried fruit, or 4 ounces (½ cup) of fruit juice. Choose fruit more often than fruit juice. · Eat 4 to 5 servings of vegetables each day.  A serving is 1 cup of lettuce or raw leafy vegetables, ½ cup of chopped or cooked vegetables, or 4 ounces (½ cup) of vegetable juice. Choose vegetables more often than vegetable juice. · Get 2 to 3 servings of low-fat and fat-free dairy each day. A serving is 8 ounces of milk, 1 cup of yogurt, or 1 ½ ounces of cheese. · Eat 6 to 8 servings of grains each day. A serving is 1 slice of bread, 1 ounce of dry cereal, or ½ cup of cooked rice, pasta, or cooked cereal. Try to choose whole-grain products as much as possible. · Limit lean meat, poultry, and fish to 2 servings each day. A serving is 3 ounces, about the size of a deck of cards. · Eat 4 to 5 servings of nuts, seeds, and legumes (cooked dried beans, lentils, and split peas) each week. A serving is 1/3 cup of nuts, 2 tablespoons of seeds, or ½ cup of cooked beans or peas. · Limit fats and oils to 2 to 3 servings each day. A serving is 1 teaspoon of vegetable oil or 2 tablespoons of salad dressing. · Limit sweets and added sugars to 5 servings or less a week. A serving is 1 tablespoon jelly or jam, ½ cup sorbet, or 1 cup of lemonade. · Eat less than 2,300 milligrams (mg) of sodium a day. If you limit your sodium to 1,500 mg a day, you can lower your blood pressure even more. · Be aware that all of these are the suggested number of servings for people who eat 1,800 to 2,000 calories a day. Your recommended number of servings may be different if you need more or fewer calories. Tips for success  · Start small. Do not try to make dramatic changes to your diet all at once. You might feel that you are missing out on your favorite foods and then be more likely to not follow the plan. Make small changes, and stick with them. Once those changes become habit, add a few more changes. · Try some of the following:  ? Make it a goal to eat a fruit or vegetable at every meal and at snacks. This will make it easy to get the recommended amount of fruits and vegetables each day.   ? Try yogurt topped with fruit and nuts for a snack or healthy dessert. ? Add lettuce, tomato, cucumber, and onion to sandwiches. ? Combine a ready-made pizza crust with low-fat mozzarella cheese and lots of vegetable toppings. Try using tomatoes, squash, spinach, broccoli, carrots, cauliflower, and onions. ? Have a variety of cut-up vegetables with a low-fat dip as an appetizer instead of chips and dip. ? Sprinkle sunflower seeds or chopped almonds over salads. Or try adding chopped walnuts or almonds to cooked vegetables. ? Try some vegetarian meals using beans and peas. Add garbanzo or kidney beans to salads. Make burritos and tacos with mashed davila beans or black beans. Where can you learn more? Go to [email or web address] org and sign in to your [employer] account. Enter J789 in the [facility] box to learn more about \"DASH Diet: Care Instructions. \"     If you do not have an account, please click on the \"Sign Up Now\" link. Current as of: August 31, 2020               Content Version: 12.8  © 2006-2021 Healthwise, [facility]. Care instructions adapted under license by [facility] ([facility]). If you have questions about a medical condition or this instruction, always ask your healthcare professional. [address]  any warranty or liability for your use of this information.

## 2021-03-29 NOTE — PROGRESS NOTES
c    HPI: Richa Feliciano presents for follow-up     chronic health issues include obesity, mixed urinary incontinence, osteoarthritis, lumbar laminectomy, replacement, hypertension, prediabetes, hyperlipidemia. intermittent a fib flutter,  with dementia.  with dementia continues to fail. Is currently in a rehab facility. Multiple falls. Rib fracture. If he comes home will need assistance.     Hypertension. Blood pressures 140 at home. Intermittent A. fib flutter on event monitor. Nonischemic thallium with normal ejection fraction . Did not use Xarelto. On amlodipine and beta-blocker. No apnea known. No recurrent palpitations. Positive prediabetes. Mild elevation LDL in the past.  .   History GE reflux no longer having symptoms. Has discontinued her PPI.      Lumbar laminectomy with lower extremity paresthesias and spinal stimulator Tylenol.  Knees and hips are doing well. Some paresthesias. Is careful not to fall. Urinary incontinence.      , no FH ovarian, breast. No abnormal Pap smears amenorrheic. Urinary incontinence.  Sexually active.     PMH    Total hip replacement x 2     Lumbar laminectomy          pool 60 min twice weekly until  institutionalized. Mckenzie Redman poor health positive dementia. . Son  5 from fall at [employer]. 2 grandchildren. Secondary to tobacco.  No alcohol. Not currently exercising.     FH: +heart failure, prostate cancer, heart disease, OA          - ovarin, breast cancer, mental illness     Review of systems: Up-to-date on eye exams. Delinquent dental exams. . Occasional wheeze. No breathing issues today. Denies any chest pain positive palpitations. . Rare GE reflux. No bloody stools or change in stools. Told she needs no more colonoscopies. Urinary incontinence. No vaginal complaints. . Osteoarthritis.  Skin cancers with routine follow-up with dermatology       Constitutional, ent, CV, respiratory, GI, , joint, skin, allergic and psychiatric ROS reviewed and negative except for above    Allergies   Allergen Reactions    Penicillins Hives    Sulfa Antibiotics Itching    Levofloxacin      \" Cannot remember\"    Cymbalta [Duloxetine Hcl] Anxiety    Lyrica [Pregabalin] Anxiety    Tramadol Nausea Only       Outpatient Medications Marked as Taking for the 3/29/21 encounter (Office Visit) with Pasquale Rendon MD   Medication Sig Dispense Refill    amLODIPine (NORVASC) 5 MG tablet TAKE 1 TABLET EVERY 24 HOURS 90 tablet 0    metoprolol succinate (TOPROL XL) 50 MG extended release tablet TAKE 1 TABLET EVERY DAY 90 tablet 0    Multiple Vitamins-Minerals (THERAPEUTIC MULTIVITAMIN-MINERALS) tablet Take 1 tablet by mouth nightly      Omega-3 Fatty Acids (FISH OIL) 1000 MG CAPS Take 1,000 mg by mouth nightly Not sure of dosage      Glucosamine-Chondroit-Vit C-Mn (GLUCOSAMINE 1500 COMPLEX) CAPS Take 1,500 mg by mouth daily. (Patient taking differently: Take 1,500 mg by mouth nightly ) 30 capsule 5             Past Medical History:   Diagnosis Date    Allergic rhinitis     Arrhythmia 2/21/2020 2.2020 The rhythm was sinus. Average OK interval 0.18, average QRS duration 0.11 [prolonged]. Average daily heart rate 59 ranging from 48 to 87 bpm.Bradycardia present for 21% of test.2. Frequent premature supraventricular ectopic beats total 1,250 consistingof 1,035 isolated PACs, 45 atrial pairs, 21 atrial runs. The longest atrial run was 10 beats HR-102 bpm occurring at 05:12:01. The fastest atrial    Breast mass in female 4/30/2012    Cataract 4/17/2012    Chest pain 4/29/13    normal Lexiscan at [facility]: Dr. Hernade[facility]    Closed fracture of first metatarsal bone     Dr. Jenna Fry HTN (hypertension)     Hyperlipidemia     LLQ abdominal pain 4/5/2012    Migraine     stopped with menopause    Osteoarthritis     Osteopenia 4/17/2018    Pneumonia 5/3/2012    Spinal stenosis     Dr. Kenton Streeter    [place] Ventricular ectopy 2/21/2020 2.2020 The rhythm was sinus. Average OH interval 0.18, average QRS duration 0.11 [prolonged]. Average daily heart rate 59 ranging from 48 to 87 bpm.Bradycardia present for 21% of test.2. Frequent premature supraventricular ectopic beats total 1,250 consistingof 1,035 isolated PACs, 45 atrial pairs, 21 atrial runs. The longest atrial run was 10 beats HR-102 bpm occurring at 05:12:01. The fastest atrial       Past Surgical History:   Procedure Laterality Date    BACK SURGERY Left 2020    EXCISION BACK AND LEFT EYEBROW LESIONS, CURETTAGE FOREHEAD LESION, INTERMEDIATE LAYERED REPAIR OF INCISIONS, CURETTAGE FOREHEAD LESION performed by Beverly Luu MD at [facility] Right     bx    CATARACT REMOVAL Bilateral 2012    Dr. Mayuri Gomez      Dr. Aury Berg      Spinal stimulator    TOTAL HIP ARTHROPLASTY  11    Left Dr. Jonathan Lake at [facility]  12    Right Dr. Jonathan Lake             Family History   Problem Relation Age of Onset    Heart Disease Mother          at 80 heart failure    Cancer Father          at 76 prostate CA             Objective     /78   Pulse 63   Temp 97.3 °F (36.3 °C)   Ht 5' 7\" (1.702 m)   Wt 183 lb 3.2 oz (83.1 kg)   SpO2 98%   BMI 28.69 kg/m²     @LASTSAO2(3)@    Wt Readings from Last 3 Encounters:   10/08/20 182 lb (82.6 kg)   20 181 lb (82.1 kg)   20 185 lb (83.9 kg)       Physical Exam     NAD alert and cooperative  HEENT: Throat is clear. Cerumen on the left. Right is clear. Normal tympanic membrane. Good upstroke of the carotids no bruits. Lungs are clear. Good MADHU ratio without any wheezes rales or rhonchi. Cardiovascular exam no ectopy noted. No murmur. Breast without any masses. Inverted nipple. Abdomen is benign no hepatosplenomegaly epigastric tenderness or mass. Trace peripheral edema. Sun damaged skin.       Chemistry        Component Value Date/Time     10/08/2020 1053    K 4.5 10/08/2020 1053     10/08/2020 1053    CO2 25 10/08/2020 1053    BUN 19 10/08/2020 1053    CREATININE 0.8 10/08/2020 1053        Component Value Date/Time    CALCIUM 9.7 10/08/2020 1053    ALKPHOS 93 10/08/2020 1053    AST 19 10/08/2020 1053    ALT 12 10/08/2020 1053    BILITOT 0.3 10/08/2020 1053    BILITOT 0.5 11/16/2011            Lab Results   Component Value Date    WBC 6.2 01/22/2020    HGB 13.2 01/22/2020    HCT 40.0 01/22/2020    MCV 91.5 01/22/2020     01/22/2020     Lab Results   Component Value Date    LABA1C 6.1 07/09/2020     Lab Results   Component Value Date    .4 07/09/2020     Lab Results   Component Value Date    LABA1C 6.1 07/09/2020     No components found for: CHLPL  Lab Results   Component Value Date    TRIG 295 (H) 01/22/2020    TRIG 287 (H) 04/03/2019    TRIG 169 (H) 03/10/2017     Lab Results   Component Value Date    HDL 49 01/22/2020    HDL 49 04/03/2019    HDL 51 03/10/2017     Lab Results   Component Value Date    LDLCALC 91 01/22/2020    LDLCALC 121 (H) 04/03/2019    LDLCALC 126 (H) 03/10/2017     Lab Results   Component Value Date    LABVLDL 59 01/22/2020    LABVLDL 57 04/03/2019    LABVLDL 34 03/10/2017       Old labs and records reviewed or requested  Discussed past lab and studies with patient    Diagnosis Orders   1. Essential hypertension, benign  Basic Metabolic Panel    Lipid Panel   2. Paroxysmal atrial fibrillation (HCC)  Lipid Panel   3. Prediabetes  Hemoglobin A1C   4. Primary osteoarthritis, unspecified site     5. Osteopenia, unspecified location     6. SCC (squamous cell carcinoma)       Hypertension. First blood pressure was a bit high. Repeat normal.  Will check blood pressure come back with a cuff in the next 1 to 2 weeks. Paroxysmal atrial fibrillation. Asymptomatic. No fast ventricular rate. Declines Xarelto. Will use baby aspirin. Prediabetes. Elevated BMI.   Will check again today.    Osteopenia. History squamous cell carcinoma. Continue with dermatology. Difficult situation with failing  . No follow-ups on file. Diagnosis and treatment discussed.   Possible side effects of medication reviewed  Patients questions answered  Follow up understood  Pt aware if they are not contacted about any test results , this does not mean they are normal.  They should call

## 2021-03-30 LAB
ESTIMATED AVERAGE GLUCOSE: 137 MG/DL
HBA1C MFR BLD: 6.4 %

## 2021-04-06 ENCOUNTER — NURSE ONLY (OUTPATIENT)
Dept: FAMILY MEDICINE CLINIC | Age: 81
End: 2021-04-06

## 2021-04-06 VITALS — DIASTOLIC BLOOD PRESSURE: 77 MMHG | HEART RATE: 60 BPM | SYSTOLIC BLOOD PRESSURE: 145 MMHG

## 2021-04-06 DIAGNOSIS — I10 ESSENTIAL HYPERTENSION: Primary | ICD-10-CM

## 2021-04-26 DIAGNOSIS — I10 ESSENTIAL HYPERTENSION, BENIGN: ICD-10-CM

## 2021-04-26 RX ORDER — METOPROLOL SUCCINATE 50 MG/1
TABLET, EXTENDED RELEASE ORAL
Qty: 90 TABLET | Refills: 0 | Status: SHIPPED | OUTPATIENT
Start: 2021-04-26 | End: 2021-07-06

## 2021-04-26 RX ORDER — AMLODIPINE BESYLATE 5 MG/1
TABLET ORAL
Qty: 90 TABLET | Refills: 0 | Status: SHIPPED | OUTPATIENT
Start: 2021-04-26 | End: 2021-07-06

## 2021-07-02 DIAGNOSIS — I10 ESSENTIAL HYPERTENSION, BENIGN: ICD-10-CM

## 2021-07-06 RX ORDER — METOPROLOL SUCCINATE 50 MG/1
TABLET, EXTENDED RELEASE ORAL
Qty: 90 TABLET | Refills: 0 | Status: SHIPPED | OUTPATIENT
Start: 2021-07-06 | End: 2021-09-14

## 2021-07-06 RX ORDER — AMLODIPINE BESYLATE 5 MG/1
TABLET ORAL
Qty: 90 TABLET | Refills: 0 | Status: SHIPPED | OUTPATIENT
Start: 2021-07-06 | End: 2021-09-14

## 2021-07-06 NOTE — TELEPHONE ENCOUNTER
Last BP up a bit. Does she check at home? What do they run. May recommend increase norvasc to 2 pills daily if consistently > 140.  Side effect of higher dose can be edema

## 2021-08-13 ENCOUNTER — OFFICE VISIT (OUTPATIENT)
Dept: FAMILY MEDICINE CLINIC | Age: 81
End: 2021-08-13
Payer: MEDICARE

## 2021-08-13 VITALS
HEART RATE: 94 BPM | WEIGHT: 176 LBS | BODY MASS INDEX: 27.57 KG/M2 | OXYGEN SATURATION: 95 % | TEMPERATURE: 99.4 F | SYSTOLIC BLOOD PRESSURE: 154 MMHG | RESPIRATION RATE: 16 BRPM | DIASTOLIC BLOOD PRESSURE: 76 MMHG

## 2021-08-13 DIAGNOSIS — J30.1 SEASONAL ALLERGIC RHINITIS DUE TO POLLEN: Primary | ICD-10-CM

## 2021-08-13 PROBLEM — Z63.4 WIDOWED: Status: ACTIVE | Noted: 2021-08-13

## 2021-08-13 PROCEDURE — G8399 PT W/DXA RESULTS DOCUMENT: HCPCS | Performed by: INTERNAL MEDICINE

## 2021-08-13 PROCEDURE — G8428 CUR MEDS NOT DOCUMENT: HCPCS | Performed by: INTERNAL MEDICINE

## 2021-08-13 PROCEDURE — 99213 OFFICE O/P EST LOW 20 MIN: CPT | Performed by: INTERNAL MEDICINE

## 2021-08-13 PROCEDURE — G8417 CALC BMI ABV UP PARAM F/U: HCPCS | Performed by: INTERNAL MEDICINE

## 2021-08-13 PROCEDURE — 1090F PRES/ABSN URINE INCON ASSESS: CPT | Performed by: INTERNAL MEDICINE

## 2021-08-13 PROCEDURE — 1036F TOBACCO NON-USER: CPT | Performed by: INTERNAL MEDICINE

## 2021-08-13 PROCEDURE — 4040F PNEUMOC VAC/ADMIN/RCVD: CPT | Performed by: INTERNAL MEDICINE

## 2021-08-13 PROCEDURE — 1123F ACP DISCUSS/DSCN MKR DOCD: CPT | Performed by: INTERNAL MEDICINE

## 2021-08-13 RX ORDER — CETIRIZINE HYDROCHLORIDE 10 MG/1
10 TABLET ORAL DAILY
Qty: 30 TABLET | Refills: 0 | Status: SHIPPED | OUTPATIENT
Start: 2021-08-13 | End: 2021-09-12

## 2021-08-13 RX ORDER — PREDNISONE 20 MG/1
20 TABLET ORAL DAILY
Qty: 5 TABLET | Refills: 0 | Status: SHIPPED | OUTPATIENT
Start: 2021-08-13 | End: 2021-08-18

## 2021-08-13 ASSESSMENT — ENCOUNTER SYMPTOMS
NAUSEA: 0
SINUS PAIN: 0
DIARRHEA: 0
VOMITING: 0
SHORTNESS OF BREATH: 0
EYE DISCHARGE: 0
EYE REDNESS: 0
SORE THROAT: 0
RHINORRHEA: 0
WHEEZING: 0
SINUS PRESSURE: 0
ABDOMINAL PAIN: 0
COUGH: 1

## 2021-08-13 NOTE — PROGRESS NOTES
Yannick Hdz (:  1940) is a 78 y.o. female,Established patient, here for evaluation of the following chief complaint(s):  Cough and Congestion         ASSESSMENT/PLAN:  1. Seasonal allergic rhinitis due to pollen  Suspect allergic rhinitis causing cough. She has Flonase and Astelin at home and she is advised to start those as well as an antihistamine. She is also given a short burst of prednisone but I advised her to hold off on this for a few days to first see if the other medications will improve her cough. If not, she can try the steroid. Return precautions reviewed for new, worsening, unresolved symptoms. She was agreement understanding of plan    Return if symptoms worsen or fail to improve. Subjective   SUBJECTIVE/OBJECTIVE:  HPI  Patient presents for evaluation of cough. Started last Friday when she mowed her grass. She reports the grass was very high and she had not noted in a while. She immediately started having issues with coughing. She does have a history of allergic rhinitis but typically does not have seasonal allergies. She has not felt unwell. Initially thought the cough was improving until last night when it was waking her up from sleep. No significant congestion or drainage. No shortness of breath or wheezing. No prior history of pulmonary disease including asthma. No fever, chills, myalgias. Patient not tried anything over-the-counter for it. Review of Systems   Constitutional: Negative for chills and fever. HENT: Negative for congestion, ear pain, rhinorrhea, sinus pressure, sinus pain and sore throat. Eyes: Negative for discharge and redness. Respiratory: Positive for cough. Negative for shortness of breath and wheezing. Cardiovascular: Negative for chest pain, palpitations and leg swelling. Gastrointestinal: Negative for abdominal pain, diarrhea, nausea and vomiting. Genitourinary: Negative for dysuria. Musculoskeletal: Negative for myalgias. Skin: Negative for rash. Objective    BP (!) 154/76   Pulse 94   Temp 99.4 °F (37.4 °C)   Resp 16   Wt 176 lb (79.8 kg)   SpO2 95%   BMI 27.57 kg/m²     Physical Exam  Constitutional:       General: She is not in acute distress. Appearance: She is well-developed. HENT:      Head: Normocephalic and atraumatic. Right Ear: Tympanic membrane, ear canal and external ear normal.      Left Ear: Tympanic membrane, ear canal and external ear normal.      Nose: Nose normal. No congestion or rhinorrhea. Mouth/Throat:      Mouth: Mucous membranes are moist.      Pharynx: Oropharynx is clear. No oropharyngeal exudate or posterior oropharyngeal erythema. Eyes:      General:         Right eye: No discharge. Left eye: No discharge. Conjunctiva/sclera: Conjunctivae normal.      Pupils: Pupils are equal, round, and reactive to light. Cardiovascular:      Rate and Rhythm: Normal rate and regular rhythm. Heart sounds: Normal heart sounds. No murmur heard. Pulmonary:      Effort: Pulmonary effort is normal. No respiratory distress. Breath sounds: Normal breath sounds. No wheezing or rales. Musculoskeletal:         General: No tenderness. Cervical back: Neck supple. Lymphadenopathy:      Cervical: No cervical adenopathy. Skin:     General: Skin is warm and dry. Capillary Refill: Capillary refill takes less than 2 seconds. Findings: No rash. Neurological:      Cranial Nerves: No cranial nerve deficit. An electronic signature was used to authenticate this note.     --Bipin Norwood MD

## 2021-09-10 DIAGNOSIS — I10 ESSENTIAL HYPERTENSION, BENIGN: ICD-10-CM

## 2021-09-14 RX ORDER — AMLODIPINE BESYLATE 5 MG/1
TABLET ORAL
Qty: 90 TABLET | Refills: 0 | Status: SHIPPED | OUTPATIENT
Start: 2021-09-14 | End: 2022-09-19

## 2021-09-14 RX ORDER — METOPROLOL SUCCINATE 50 MG/1
TABLET, EXTENDED RELEASE ORAL
Qty: 90 TABLET | Refills: 0 | Status: SHIPPED | OUTPATIENT
Start: 2021-09-14 | End: 2022-09-19

## 2021-09-25 ENCOUNTER — APPOINTMENT (OUTPATIENT)
Dept: GENERAL RADIOLOGY | Age: 81
End: 2021-09-25
Payer: MEDICARE

## 2021-09-25 ENCOUNTER — HOSPITAL ENCOUNTER (EMERGENCY)
Age: 81
Discharge: HOME | End: 2021-09-25
Payer: MEDICARE

## 2021-09-25 VITALS
TEMPERATURE: 97.6 F | SYSTOLIC BLOOD PRESSURE: 221 MMHG | HEART RATE: 62 BPM | RESPIRATION RATE: 16 BRPM | OXYGEN SATURATION: 99 % | DIASTOLIC BLOOD PRESSURE: 91 MMHG

## 2021-09-25 DIAGNOSIS — S52.502A CLOSED FRACTURE OF DISTAL END OF LEFT RADIUS, INITIAL ENCOUNTER: Primary | ICD-10-CM

## 2021-09-25 PROCEDURE — 99284 EMERGENCY DEPT VISIT MOD MDM: CPT

## 2021-09-25 PROCEDURE — 29125 APPL SHORT ARM SPLINT STATIC: CPT

## 2021-09-25 PROCEDURE — 73110 X-RAY EXAM OF WRIST: CPT

## 2021-09-25 PROCEDURE — 6370000000 HC RX 637 (ALT 250 FOR IP): Performed by: PHYSICIAN ASSISTANT

## 2021-09-25 RX ORDER — NAPROXEN 250 MG/1
250 TABLET ORAL ONCE
Status: COMPLETED | OUTPATIENT
Start: 2021-09-25 | End: 2021-09-25

## 2021-09-25 RX ORDER — HYDROCODONE BITARTRATE AND ACETAMINOPHEN 5; 325 MG/1; MG/1
1 TABLET ORAL EVERY 6 HOURS PRN
Qty: 15 TABLET | Refills: 0 | Status: SHIPPED | OUTPATIENT
Start: 2021-09-25 | End: 2021-09-29

## 2021-09-25 RX ORDER — HYDROCODONE BITARTRATE AND ACETAMINOPHEN 7.5; 325 MG/1; MG/1
1 TABLET ORAL ONCE
Status: COMPLETED | OUTPATIENT
Start: 2021-09-25 | End: 2021-09-25

## 2021-09-25 RX ADMIN — NAPROXEN 250 MG: 250 TABLET ORAL at 20:08

## 2021-09-25 RX ADMIN — HYDROCODONE BITARTRATE AND ACETAMINOPHEN 1 TABLET: 7.5; 325 TABLET ORAL at 21:29

## 2021-09-25 ASSESSMENT — PAIN SCALES - GENERAL
PAINLEVEL_OUTOF10: 10
PAINLEVEL_OUTOF10: 10
PAINLEVEL_OUTOF10: 3
PAINLEVEL_OUTOF10: 10

## 2021-09-25 ASSESSMENT — PAIN DESCRIPTION - ORIENTATION: ORIENTATION: LEFT

## 2021-09-25 ASSESSMENT — PAIN - FUNCTIONAL ASSESSMENT: PAIN_FUNCTIONAL_ASSESSMENT: 0-10

## 2021-09-25 ASSESSMENT — PAIN DESCRIPTION - LOCATION: LOCATION: WRIST

## 2021-09-26 NOTE — ED PROVIDER NOTES
**ADVANCED PRACTICE PROVIDER, I HAVE EVALUATED THIS PATIENT**        [address]      Pt Name: Juwan Aviles  WSF:[id number]  Georgigfjosh 1940  Date of evaluation: 9/25/2021  Provider: Ling Horta PA-C      Chief Complaint:    Chief Complaint   Patient presents with    Fall     patient states that she fell while trying to smash a box by stepping on it. pt. c/o left wrist pain. denies any other injuries. Nursing Notes, Past Medical Hx, Past Surgical Hx, Social Hx, Allergies, and Family Hx were all reviewed and agreed with or any disagreements were addressed in the HPI.    HPI: (Location, Duration, Timing, Severity, Quality, Assoc Sx, Context, Modifying factors)    Chief Complaint of left wrist to hand pain    This is a  78 y.o. female who presents stating that about 4 PM this afternoon she was trying to crush a [place] box by stepping on it with her left foot. Unfortunately this got her off balance and she fell hurting her left wrist.  It has been swelling and hurting ever since. She states that when she is at rest and is still he does not hurt at all. She did take 2 Tylenol 2 hours ago. However when she moves it it is 10 out of 10 sharp in that left wrist.  No finger acute loss of sensation or movement. No elbow pain or shoulder pain head contusion or neck pain. No other acute complaint. PastMedical/Surgical History:      Diagnosis Date    Allergic rhinitis     Arrhythmia 2/21/2020 2.2020 The rhythm was sinus. Average AK interval 0.18, average QRS duration 0.11 [prolonged]. Average daily heart rate 59 ranging from 48 to 87 bpm.Bradycardia present for 21% of test.2. Frequent premature supraventricular ectopic beats total 1,250 consistingof 1,035 isolated PACs, 45 atrial pairs, 21 atrial runs. The longest atrial run was 10 beats HR-102 bpm occurring at 05:12:01. The fastest atrial    Breast mass in female 4/30/2012    Cataract 4/17/2012    Chest pain 4/29/13    [facility] Lexiscan at [facility]: Dr. Brown Ye    Closed fracture of first metatarsal bone     Dr. Nina Estrada HTN (hypertension)     Hyperlipidemia     LLQ abdominal pain 4/5/2012    Migraine     stopped with menopause    Osteoarthritis     Osteopenia 4/17/2018    Pneumonia 5/3/2012    Spinal stenosis     Dr. Yesenia Sandoval,    Ibrahim Ventricular ectopy 2/21/2020 2.2020 The rhythm was sinus. Average ID interval 0.18, average QRS duration 0.11 [prolonged]. Average daily heart rate 59 ranging from 48 to 87 bpm.Bradycardia present for 21% of test.2. Frequent premature supraventricular ectopic beats total 1,250 consistingof 1,035 isolated PACs, 45 atrial pairs, 21 atrial runs. The longest atrial run was 10 beats HR-102 bpm occurring at 05:12:01. The fastest atrial         Procedure Laterality Date    BACK SURGERY Left 1/29/2020    EXCISION BACK AND LEFT EYEBROW LESIONS, CURETTAGE FOREHEAD LESION, INTERMEDIATE LAYERED REPAIR OF INCISIONS, CURETTAGE FOREHEAD LESION performed by Savi Coronel MD at [facility] Right     bx    CATARACT REMOVAL Bilateral 04/26/2012    Dr. Maikel Ramírez  2004    Dr. Salo Gallo  2006    Spinal stimulator    TOTAL HIP ARTHROPLASTY  11/30/11    Left Dr. Lu Douglas at [facility]  1/4/12    Right Dr. Lu Douglas       Medications:  Previous Medications    AMLODIPINE (NORVASC) 5 MG TABLET    TAKE 1 TABLET EVERY 24 HOURS    BUSPIRONE (BUSPAR) 5 MG TABLET    TAKE ONE TO TWO TABLETS BY MOUTH EVERY DAY AS NEEDED FOR SPELL    GLUCOSAMINE-CHONDROIT-VIT C-MN (GLUCOSAMINE 1500 COMPLEX) CAPS    Take 1,500 mg by mouth daily.     METOPROLOL SUCCINATE (TOPROL XL) 50 MG EXTENDED RELEASE TABLET    TAKE 1 TABLET EVERY DAY    MULTIPLE VITAMINS-MINERALS (THERAPEUTIC MULTIVITAMIN-MINERALS) TABLET    Take 1 tablet by mouth nightly    NONFORMULARY    daily Tumeric-puts 1/4 tsp in shake    OMEGA-3 FATTY ACIDS (FISH OIL) 1000 MG CAPS    Take 1,000 mg by mouth nightly Not sure of dosage         Review of Systems:  (2-9 systems needed)  Review of Systems  Positive for mechanical fall onto the outstretched left upper extremity with resultant pain swelling and tenderness worse with movement and better at rest in the left wrist.  No knuckle pain or tenderness for finger acute loss of sensation or movement. No elbow pain or shoulder pain dizziness or confusion. Patient indicates that she drove herself to the emergency department. \"Positives and Pertinent negatives as per HPI\"    Physical Exam:  Physical Exam  Vitals and nursing note reviewed. Constitutional:       Appearance: Normal appearance. She is not diaphoretic. HENT:      Head: Normocephalic and atraumatic. Right Ear: External ear normal.      Left Ear: External ear normal.      Nose: Nose normal.   Eyes:      General:         Right eye: No discharge. Left eye: No discharge. Conjunctiva/sclera: Conjunctivae normal.   Cardiovascular:      Rate and Rhythm: Normal rate and regular rhythm. Pulses: Normal pulses. Heart sounds: Normal heart sounds. No murmur heard. No gallop. Pulmonary:      Effort: Pulmonary effort is normal. No respiratory distress. Breath sounds: Normal breath sounds. No wheezing, rhonchi or rales. Musculoskeletal:         General: Swelling, tenderness, deformity and signs of injury present. Cervical back: Normal range of motion and neck supple. Comments: Left wrist swelling deformity tenderness and injury without increased heat or redness. Distal pulses 2+ bilaterally in dorsalis pedis. Cap refill brisk less than 1 second throughout. No further hand pain or finger pain. No elbow pain or shoulder pain. Skin:     General: Skin is warm and dry. Capillary Refill: Capillary refill takes less than 2 seconds. Findings: No rash.    Neurological:      Mental Status: She is alert and oriented to person, place, and time. Mental status is at baseline. Psychiatric:         Mood and Affect: Mood normal.         Behavior: Behavior normal.         MEDICAL DECISION MAKING    Vitals:    Vitals:    09/25/21 1953   BP: (!) 221/91   Pulse: 62   Resp: 16   Temp: 97.6 °F (36.4 °C)   TempSrc: Oral   SpO2: 99%       LABS:Labs Reviewed - No data to display     Remainder of labs reviewed and were negative at this time or not returned at the time of this note. RADIOLOGY:   Non-plain film images such as CT, Ultrasound and MRI are read by the radiologist. Huong Joseph PA-C have directly visualized the radiologic plain film image(s) with the below findings:      Interpretation per the Radiologist below, if available at the time of this note:    XR WRIST LEFT (MIN 3 VIEWS)   Final Result   Osteopenia. Subtle oblique intra-articular fracture involving the left radial styloid. No measurable displacement, distraction, angulation or overriding were noted. Degenerative osteo arthritic changes were noted, moderate to between the   scaphoid and trapezium and marked between the trapezium and left 1st   metacarpal.              No results found. MEDICAL DECISION MAKING / ED COURSE:      PROCEDURES:   Procedures    None    Patient was given:  Medications   naproxen (NAPROSYN) tablet 250 mg (250 mg Oral Given 9/25/21 2008)     This patient presents as above and evaluation and treatment is begun. She has already taken some Tylenol couple of hours ago. She is driving this evening. We will give some naproxen to help out with discomfort as well as ice pack. Patient agrees to getting an x-ray of the left wrist area and is order is placed. Additionally with the help of nursing we are able to get the patient's left ring finger ring off of that finger and she places it on a finger of the other hand. X-ray series returns as above demonstrating the closed intra-articular distal left humerus fracture.   Appropriate OCL splint placed for the patient's left upper extremity by nursing. Neurovascular status checked post splint placement and is fully intact. Otherwise patient in good condition with no other acute injury or complaint. Conservative home care with close outpatient follow-up with orthopedics is recommended to the patient verbalizes understanding and agreement with the above and the following discharge home plan. Keep the splint clean dry and in place, may ice the area 15 to 20 minutes every few hours as needed for the next couple of days and use medication as written for comfort. Call orthopedics Monday morning for next available appointment further care and treatment. Return to the emergency department for any emergency worsening or concern. The patient tolerated their visit well. I evaluated the patient. The physician was available for consultation as needed. The patient and / or the family were informed of the results of any tests, a time was given to answer questions, a plan was proposed and they agreed with plan. CLINICAL IMPRESSION:  1. Closed fracture of distal end of left radius, initial encounter        DISPOSITION Decision To Discharge 09/25/2021 09:05:08 PM      PATIENT REFERRED TO:  [address] [facility]  [address]  [phone]  Call in 2 days        DISCHARGE MEDICATIONS:  New Prescriptions    HYDROCODONE-ACETAMINOPHEN (NORCO) 5-325 MG PER TABLET    Take 1 tablet by mouth every 6 hours as needed for Pain for up to 4 days. Caution, causes drowsiness.   No driving with this medicine       DISCONTINUED MEDICATIONS:  Discontinued Medications    No medications on file              (Please note the MDM and HPI sections of this note were completed with a voice recognition program.  Efforts were made to edit the dictations but occasionally words are mis-transcribed.)    Electronically signed, Bindu Mosley PA-C, Ling Horta PA-C  09/25/21 [id number]

## 2021-09-28 ENCOUNTER — OFFICE VISIT (OUTPATIENT)
Dept: ORTHOPEDIC SURGERY | Age: 81
End: 2021-09-28
Payer: MEDICARE

## 2021-09-28 VITALS — WEIGHT: 176 LBS | BODY MASS INDEX: 27.62 KG/M2 | HEIGHT: 67 IN

## 2021-09-28 DIAGNOSIS — S52.515A NONDISPLACED FRACTURE OF LEFT RADIAL STYLOID PROCESS, INITIAL ENCOUNTER FOR CLOSED FRACTURE: Primary | ICD-10-CM

## 2021-09-28 DIAGNOSIS — M85.80 OSTEOPENIA DETERMINED BY X-RAY: ICD-10-CM

## 2021-09-28 PROCEDURE — 1036F TOBACCO NON-USER: CPT | Performed by: ORTHOPAEDIC SURGERY

## 2021-09-28 PROCEDURE — G8399 PT W/DXA RESULTS DOCUMENT: HCPCS | Performed by: ORTHOPAEDIC SURGERY

## 2021-09-28 PROCEDURE — 1090F PRES/ABSN URINE INCON ASSESS: CPT | Performed by: ORTHOPAEDIC SURGERY

## 2021-09-28 PROCEDURE — 1123F ACP DISCUSS/DSCN MKR DOCD: CPT | Performed by: ORTHOPAEDIC SURGERY

## 2021-09-28 PROCEDURE — G8417 CALC BMI ABV UP PARAM F/U: HCPCS | Performed by: ORTHOPAEDIC SURGERY

## 2021-09-28 PROCEDURE — G8427 DOCREV CUR MEDS BY ELIG CLIN: HCPCS | Performed by: ORTHOPAEDIC SURGERY

## 2021-09-28 PROCEDURE — 4040F PNEUMOC VAC/ADMIN/RCVD: CPT | Performed by: ORTHOPAEDIC SURGERY

## 2021-09-28 PROCEDURE — L3908 WHO COCK-UP NONMOLDE PRE OTS: HCPCS | Performed by: ORTHOPAEDIC SURGERY

## 2021-09-28 PROCEDURE — 99203 OFFICE O/P NEW LOW 30 MIN: CPT | Performed by: ORTHOPAEDIC SURGERY

## 2021-09-28 NOTE — PROGRESS NOTES
Procedures    Madeline Chowdhury Titan Wrist and Thumb Brace     Patient was prescribed a Madeline Chowdhury Titan Wrist and Thumb Brace. The left wrist and thumb will require stabilization / immobilization from this semi-rigid / rigid orthosis to improve their function. The orthosis will assist in protecting the affected area, provide functional support and facilitate healing. The patient was educated and fit by a healthcare professional with expert knowledge and specialization in brace application while under the direct supervision of the treating physician. Verbal and written instructions for the use of and application of this item were provided. They were instructed to contact the office immediately should the brace result in increased pain, decreased sensation, increased swelling or worsening of the condition.

## 2021-09-28 NOTE — PROGRESS NOTES
ORTHOPAEDIC NEW PATIENT NOTE    Chief Complaint   Patient presents with    Wrist Pain     Left wrist injury -  DOI 9/25/2021       HPI   9/28/21  78 y.o. female RHD seen for evaluation of left wrist injury/fracture:    Patient reports she injured her left wrist on September 25, 2021  She fell and landed on her left side  The pain is described as over the radial wrist  Worse with activity, range of motion   Better with splint/immobilization from the ED  No open wounds  Denies numbness and tingling  No history of left wrist injury      I have reviewed and discussed the below pain assessment findings with the patient. Pain Assessment  Location of Pain: Wrist  Location Modifiers: Left  Severity of Pain: 0  Quality of Pain: Aching  Duration of Pain: Persistent  Frequency of Pain: Intermittent  Date Pain First Started: 09/25/21  Aggravating Factors: Bending  Limiting Behavior: Yes  Relieving Factors: Rest  Result of Injury: Yes  Work-Related Injury: No      Review of Systems  I have read over the ROS from the Patient History Form dated on 9/28/2021  Pertinent positives include skin condition, hypertension, frequency, chronic neck and back pain  Rest of 13 point ROS otherwise negative except per HPI, and scanned into the patient's chart under the Media tab. Allergies   Allergen Reactions    Penicillins Hives    Sulfa Antibiotics Itching    Levofloxacin      \" Cannot remember\"    Cymbalta [Duloxetine Hcl] Anxiety    Lyrica [Pregabalin] Anxiety    Tramadol Nausea Only        Current Outpatient Medications   Medication Sig Dispense Refill    HYDROcodone-acetaminophen (NORCO) 5-325 MG per tablet Take 1 tablet by mouth every 6 hours as needed for Pain for up to 4 days. Caution, causes drowsiness.   No driving with this medicine 15 tablet 0    metoprolol succinate (TOPROL XL) 50 MG extended release tablet TAKE 1 TABLET EVERY DAY 90 tablet 0    amLODIPine (NORVASC) 5 MG tablet TAKE 1 TABLET EVERY 24 HOURS 90 tablet intra-articular fracture involving the left radial styloid. No measurable displacement, distraction, angulation or overriding were noted.       Degenerative osteo arthritic changes were noted, moderate to between the   scaphoid and trapezium and marked between the trapezium and left 1st   metacarpal.         Assessment & Plan:  78 y.o. female who presents with    Diagnosis Orders   1. Nondisplaced fracture of left radial styloid process, initial encounter for closed fracture  Madeline Chowdhury Titan Wrist and Thumb Brace   2. Osteopenia determined by x-ray             Procedures    Madeline Chowdhury Titan Wrist and Thumb Brace     Patient was prescribed a Madeline Chowdhury Titan Wrist and Thumb Brace. The left wrist and thumb will require stabilization / immobilization from this semi-rigid / rigid orthosis to improve their function. The orthosis will assist in protecting the affected area, provide functional support and facilitate healing. The patient was educated and fit by a healthcare professional with expert knowledge and specialization in brace application while under the direct supervision of the treating physician. Verbal and written instructions for the use of and application of this item were provided. They were instructed to contact the office immediately should the brace result in increased pain, decreased sensation, increased swelling or worsening of the condition. Reviewed injury/fracture and radiographs with the patient  I have recommended nonoperative treatment as the fracture is nondisplaced and appears stable    Left hand/wrist nonweightbearing  Thumb spica wrist brace full-time, including at night  Can remove for hygiene only    Follow-up in 2 weeks for repeat radiographs    The patient is at risk for osteoporosis and likely has or is at risk for fragility fractures. Therefore, I have recommended treatment with bisphosphonates, and DEXA screening if not recently performed to establish baseline values.

## 2021-10-12 ENCOUNTER — OFFICE VISIT (OUTPATIENT)
Dept: ORTHOPEDIC SURGERY | Age: 81
End: 2021-10-12
Payer: MEDICARE

## 2021-10-12 VITALS — BODY MASS INDEX: 27.62 KG/M2 | HEIGHT: 67 IN | RESPIRATION RATE: 16 BRPM | WEIGHT: 176 LBS

## 2021-10-12 DIAGNOSIS — S52.515A NONDISPLACED FRACTURE OF LEFT RADIAL STYLOID PROCESS, INITIAL ENCOUNTER FOR CLOSED FRACTURE: Primary | ICD-10-CM

## 2021-10-12 PROCEDURE — 1090F PRES/ABSN URINE INCON ASSESS: CPT | Performed by: ORTHOPAEDIC SURGERY

## 2021-10-12 PROCEDURE — G8399 PT W/DXA RESULTS DOCUMENT: HCPCS | Performed by: ORTHOPAEDIC SURGERY

## 2021-10-12 PROCEDURE — 1036F TOBACCO NON-USER: CPT | Performed by: ORTHOPAEDIC SURGERY

## 2021-10-12 PROCEDURE — G8484 FLU IMMUNIZE NO ADMIN: HCPCS | Performed by: ORTHOPAEDIC SURGERY

## 2021-10-12 PROCEDURE — 4040F PNEUMOC VAC/ADMIN/RCVD: CPT | Performed by: ORTHOPAEDIC SURGERY

## 2021-10-12 PROCEDURE — G8427 DOCREV CUR MEDS BY ELIG CLIN: HCPCS | Performed by: ORTHOPAEDIC SURGERY

## 2021-10-12 PROCEDURE — G8417 CALC BMI ABV UP PARAM F/U: HCPCS | Performed by: ORTHOPAEDIC SURGERY

## 2021-10-12 PROCEDURE — 1123F ACP DISCUSS/DSCN MKR DOCD: CPT | Performed by: ORTHOPAEDIC SURGERY

## 2021-10-12 PROCEDURE — 99213 OFFICE O/P EST LOW 20 MIN: CPT | Performed by: ORTHOPAEDIC SURGERY

## 2021-10-12 NOTE — PROGRESS NOTES
ORTHOPAEDIC NEW PATIENT NOTE    Chief Complaint   Patient presents with    Follow-up     left wrist        HPI   10/12/21  Follow-up left wrist  No issues  Pain rated 0 out of 10  Using the wrist brace without issues      9/28/21  78 y.o. female RHD seen for evaluation of left wrist injury/fracture:    Patient reports she injured her left wrist on September 25, 2021  She fell and landed on her left side  The pain is described as over the radial wrist  Worse with activity, range of motion   Better with splint/immobilization from the ED  No open wounds  Denies numbness and tingling  No history of left wrist injury        Allergies   Allergen Reactions    Penicillins Hives    Sulfa Antibiotics Itching    Levofloxacin      \" Cannot remember\"    Cymbalta [Duloxetine Hcl] Anxiety    Lyrica [Pregabalin] Anxiety    Tramadol Nausea Only        Current Outpatient Medications   Medication Sig Dispense Refill    metoprolol succinate (TOPROL XL) 50 MG extended release tablet TAKE 1 TABLET EVERY DAY 90 tablet 0    amLODIPine (NORVASC) 5 MG tablet TAKE 1 TABLET EVERY 24 HOURS 90 tablet 0    busPIRone (BUSPAR) 5 MG tablet TAKE ONE TO TWO TABLETS BY MOUTH EVERY DAY AS NEEDED FOR SPELL 30 tablet 0    Multiple Vitamins-Minerals (THERAPEUTIC MULTIVITAMIN-MINERALS) tablet Take 1 tablet by mouth nightly      Omega-3 Fatty Acids (FISH OIL) 1000 MG CAPS Take 1,000 mg by mouth nightly Not sure of dosage      NONFORMULARY daily Tumeric-puts 1/4 tsp in shake      Glucosamine-Chondroit-Vit C-Mn (GLUCOSAMINE 1500 COMPLEX) CAPS Take 1,500 mg by mouth daily. (Patient taking differently: Take 1,500 mg by mouth nightly ) 30 capsule 5     No current facility-administered medications for this visit. Past Medical History:   Diagnosis Date    Allergic rhinitis     Arrhythmia 2/21/2020 2.2020 The rhythm was sinus. Average OH interval 0.18, average QRS duration 0.11 [prolonged]. Average daily heart rate 59 ranging from 48 to 87 bpm.Bradycardia present for 21% of test.2. Frequent premature supraventricular ectopic beats total 1,250 consistingof 1,035 isolated PACs, 45 atrial pairs, 21 atrial runs. The longest atrial run was 10 beats HR-102 bpm occurring at 05:12:01. The fastest atrial    Breast mass in female 2012    Cataract 2012    Chest pain 13    normal Lexiscan at [facility]: Dr. Jessica Guillory    Closed fracture of first metatarsal bone     Dr. Ankush Jean HTN (hypertension)     Hyperlipidemia     LLQ abdominal pain 2012    Migraine     stopped with menopause    Osteoarthritis     Osteopenia 2018    Pneumonia 5/3/2012    Spinal stenosis     Dorita Carreno Ventricular ectopy 2020 The rhythm was sinus. Average MT interval 0.18, average QRS duration 0.11 [prolonged]. Average daily heart rate 59 ranging from 48 to 87 bpm.Bradycardia present for 21% of test.2. Frequent premature supraventricular ectopic beats total 1,250 consistingof 1,035 isolated PACs, 45 atrial pairs, 21 atrial runs. The longest atrial run was 10 beats HR-102 bpm occurring at 05:12:01. The fastest atrial        Past Surgical History:   Procedure Laterality Date    BACK SURGERY Left 2020    EXCISION BACK AND LEFT EYEBROW LESIONS, CURETTAGE FOREHEAD LESION, INTERMEDIATE LAYERED REPAIR OF INCISIONS, CURETTAGE FOREHEAD LESION performed by Brittany Ramos [facility] at [facility] Right     bx    CATARACT REMOVAL Bilateral 2012    Dr. Kiran Floyd      Dr. Amanda Taylor  2006    Spinal stimulator    TOTAL HIP ARTHROPLASTY  11    Left Dr. Francesca mahmood at [facility]  12    Right Dr. [place]       Family History   Problem Relation Age of Onset    Heart Disease Mother          at 80 heart failure    Cancer Father          at 76 prostate CA       Social History     Socioeconomic History    Marital status:      Spouse name: Janene Santiago Number of children: Not on file    Years of education: Not on file    Highest education level: Not on file   Occupational History    Not on file   Tobacco Use    Smoking status: Never Smoker    Smokeless tobacco: Never Used   Vaping Use    Vaping Use: Never used   Substance and Sexual Activity    Alcohol use: No    Drug use: Never    Sexual activity: Not Currently   Other Topics Concern    Not on file   Social History Narrative    Not on file     Social Determinants of Health     Financial Resource Strain:     Difficulty of Paying Living Expenses:    Food Insecurity:     Worried About Running Out of Food in the Last Year:     [address] in the Last Year:    Transportation Needs:     Lack of Transportation (Medical):  Lack of Transportation (Non-Medical):    Physical Activity:     Days of Exercise per Week:     Minutes of Exercise per Session:    Stress:     Feeling of Stress :    Social Connections:     Frequency of Communication with Friends and Family:     Frequency of Social Gatherings with Friends and Family:     Attends Mor[employer] Services:     Active Member of Clubs or Organizations:     Attends Club or Organization Meetings:     Marital Status:    Intimate Partner Violence:     Fear of Current or Ex-Partner:     Emotionally Abused:     Physically Abused:     Sexually Abused:         Vitals:    10/12/21 1018   Resp: 16   Weight: 176 lb (79.8 kg)   Height: 5' 7\" (1.702 m)       Physical Exam  Constitutional  well-groomed, well-nourished, Body mass index is 27.57 kg/m².   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       HISTORY:   ORDERING SYSTEM PROVIDED HISTORY: fall   TECHNOLOGIST PROVIDED HISTORY:   Reason for exam:->fall   Reason for Exam: fall       FINDINGS:   The regional skeleton was osteopenic.  A subtle oblique intra articular   fracture involves the left radial styloid.  No measurable displacement,   distraction, angulation or overriding were noted.  Degenerative osteo   arthritic changes were noted, moderate between the scaphoid and trapezium and   marked between the trapezium and left 1st metacarpal.           Impression   Osteopenia.       Subtle oblique intra-articular fracture involving the left radial styloid. No measurable displacement, distraction, angulation or overriding were noted.       Degenerative osteo arthritic changes were noted, moderate to between the   scaphoid and trapezium and marked between the trapezium and left 1st   metacarpal.       Left wrist 3 views repeated today in clinic for serial follow-up imaging - nondisplaced radial styloid fracture is again seen. No interval displacement. No other acute osseous abnormalities. Baseline degenerative changes are unchanged. Assessment & Plan:  78 y.o. female who presents with    Diagnosis Orders   1. Nondisplaced fracture of left radial styloid process, initial encounter for closed fracture  XR WRIST LEFT (MIN 3 VIEWS)       No orders of the defined types were placed in this encounter.       Reviewed injury/fracture and radiographs with the patient  I have recommended continued nonoperative treatment as the fracture is nondisplaced and appears stable    Left hand/wrist

## 2021-10-18 ENCOUNTER — HOSPITAL ENCOUNTER (OUTPATIENT)
Dept: WOMENS IMAGING | Age: 81
Discharge: HOME | End: 2021-10-18
Payer: MEDICARE

## 2021-10-18 DIAGNOSIS — Z12.31 BREAST CANCER SCREENING BY MAMMOGRAM: ICD-10-CM

## 2021-10-18 PROCEDURE — 77067 SCR MAMMO BI INCL CAD: CPT

## 2021-11-09 ENCOUNTER — VIRTUAL VISIT (OUTPATIENT)
Dept: FAMILY MEDICINE CLINIC | Age: 81
End: 2021-11-09
Payer: MEDICARE

## 2021-11-09 ENCOUNTER — OFFICE VISIT (OUTPATIENT)
Dept: ORTHOPEDIC SURGERY | Age: 81
End: 2021-11-09
Payer: MEDICARE

## 2021-11-09 VITALS — WEIGHT: 176 LBS | BODY MASS INDEX: 27.62 KG/M2 | HEIGHT: 67 IN

## 2021-11-09 DIAGNOSIS — I10 ESSENTIAL HYPERTENSION, BENIGN: Primary | ICD-10-CM

## 2021-11-09 DIAGNOSIS — I48.0 PAROXYSMAL ATRIAL FIBRILLATION (HCC): ICD-10-CM

## 2021-11-09 DIAGNOSIS — M18.12 ARTHRITIS OF CARPOMETACARPAL (CMC) JOINT OF LEFT THUMB: ICD-10-CM

## 2021-11-09 DIAGNOSIS — Z00.00 ROUTINE GENERAL MEDICAL EXAMINATION AT A HEALTH CARE FACILITY: ICD-10-CM

## 2021-11-09 DIAGNOSIS — S52.515D CLOSED NONDISPLACED FRACTURE OF STYLOID PROCESS OF LEFT RADIUS WITH ROUTINE HEALING, SUBSEQUENT ENCOUNTER: Primary | ICD-10-CM

## 2021-11-09 DIAGNOSIS — Z71.89 ACP (ADVANCE CARE PLANNING): ICD-10-CM

## 2021-11-09 PROCEDURE — 4040F PNEUMOC VAC/ADMIN/RCVD: CPT | Performed by: NURSE PRACTITIONER

## 2021-11-09 PROCEDURE — G8399 PT W/DXA RESULTS DOCUMENT: HCPCS | Performed by: ORTHOPAEDIC SURGERY

## 2021-11-09 PROCEDURE — G8427 DOCREV CUR MEDS BY ELIG CLIN: HCPCS | Performed by: ORTHOPAEDIC SURGERY

## 2021-11-09 PROCEDURE — 4040F PNEUMOC VAC/ADMIN/RCVD: CPT | Performed by: ORTHOPAEDIC SURGERY

## 2021-11-09 PROCEDURE — 1036F TOBACCO NON-USER: CPT | Performed by: ORTHOPAEDIC SURGERY

## 2021-11-09 PROCEDURE — 1123F ACP DISCUSS/DSCN MKR DOCD: CPT | Performed by: ORTHOPAEDIC SURGERY

## 2021-11-09 PROCEDURE — G0438 PPPS, INITIAL VISIT: HCPCS | Performed by: NURSE PRACTITIONER

## 2021-11-09 PROCEDURE — G8484 FLU IMMUNIZE NO ADMIN: HCPCS | Performed by: NURSE PRACTITIONER

## 2021-11-09 PROCEDURE — G8484 FLU IMMUNIZE NO ADMIN: HCPCS | Performed by: ORTHOPAEDIC SURGERY

## 2021-11-09 PROCEDURE — 1090F PRES/ABSN URINE INCON ASSESS: CPT | Performed by: ORTHOPAEDIC SURGERY

## 2021-11-09 PROCEDURE — 1123F ACP DISCUSS/DSCN MKR DOCD: CPT | Performed by: NURSE PRACTITIONER

## 2021-11-09 PROCEDURE — G8417 CALC BMI ABV UP PARAM F/U: HCPCS | Performed by: ORTHOPAEDIC SURGERY

## 2021-11-09 PROCEDURE — 99213 OFFICE O/P EST LOW 20 MIN: CPT | Performed by: ORTHOPAEDIC SURGERY

## 2021-11-09 SDOH — ECONOMIC STABILITY: FOOD INSECURITY: WITHIN THE PAST 12 MONTHS, THE FOOD YOU BOUGHT JUST DIDN'T LAST AND YOU DIDN'T HAVE MONEY TO GET MORE.: NEVER TRUE

## 2021-11-09 SDOH — ECONOMIC STABILITY: FOOD INSECURITY: WITHIN THE PAST 12 MONTHS, YOU WORRIED THAT YOUR FOOD WOULD RUN OUT BEFORE YOU GOT MONEY TO BUY MORE.: NEVER TRUE

## 2021-11-09 ASSESSMENT — SOCIAL DETERMINANTS OF HEALTH (SDOH): HOW HARD IS IT FOR YOU TO PAY FOR THE VERY BASICS LIKE FOOD, HOUSING, MEDICAL CARE, AND HEATING?: NOT HARD AT ALL

## 2021-11-09 ASSESSMENT — LIFESTYLE VARIABLES
HOW OFTEN DO YOU HAVE A DRINK CONTAINING ALCOHOL: NEVER
AUDIT-C TOTAL SCORE: INCOMPLETE
AUDIT TOTAL SCORE: INCOMPLETE
HOW OFTEN DO YOU HAVE A DRINK CONTAINING ALCOHOL: 0

## 2021-11-09 ASSESSMENT — PATIENT HEALTH QUESTIONNAIRE - PHQ9
SUM OF ALL RESPONSES TO PHQ QUESTIONS 1-9: 0
2. FEELING DOWN, DEPRESSED OR HOPELESS: 0
SUM OF ALL RESPONSES TO PHQ QUESTIONS 1-9: 0
SUM OF ALL RESPONSES TO PHQ QUESTIONS 1-9: 0
SUM OF ALL RESPONSES TO PHQ9 QUESTIONS 1 & 2: 0
1. LITTLE INTEREST OR PLEASURE IN DOING THINGS: 0

## 2021-11-09 NOTE — PATIENT INSTRUCTIONS
Personalized Preventive Plan for Irais Sparks - 11/9/2021  Medicare offers a range of preventive health benefits. Some of the tests and screenings are paid in full while other may be subject to a deductible, co-insurance, and/or copay. Some of these benefits include a comprehensive review of your medical history including lifestyle, illnesses that may run in your family, and various assessments and screenings as appropriate. After reviewing your medical record and screening and assessments performed today your provider may have ordered immunizations, labs, imaging, and/or referrals for you. A list of these orders (if applicable) as well as your Preventive Care list are included within your After Visit Summary for your review. Other Preventive Recommendations:    · A preventive eye exam performed by an eye specialist is recommended every 1-2 years to screen for glaucoma; cataracts, macular degeneration, and other eye disorders. · A preventive dental visit is recommended every 6 months. · Try to get at least 150 minutes of exercise per week or 10,000 steps per day on a pedometer . · Order or download the FREE \"Exercise & Physical Activity: Your Everyday Guide\" from [employer]. Call 0-354.883.6113 or search [employer] online. · You need 8316-1808 mg of calcium and 5029-2322 IU of vitamin D per day. It is possible to meet your calcium requirement with diet alone, but a vitamin D supplement is usually necessary to meet this goal.  · When exposed to the sun, use a sunscreen that protects against both UVA and UVB radiation with an SPF of 30 or greater. Reapply every 2 to 3 hours or after sweating, drying off with a towel, or swimming. · Always wear a seat belt when traveling in a car. Always wear a helmet when riding a bicycle or motorcycle.

## 2021-11-09 NOTE — PROGRESS NOTES
ORTHOPAEDIC NEW PATIENT NOTE    Chief Complaint   Patient presents with    Follow-up     fu left wrist fracture       HPI   11/9/21  Follow-up left wrist  She denies pain, rated 0 out of 10  She reports the thumb spica wrist brace is irritating the base of her thumb  Denies numbness and tingling      10/12/21  Follow-up left wrist  No issues  Pain rated 0 out of 10  Using the wrist brace without issues      9/28/21  78 y.o. female RHD seen for evaluation of left wrist injury/fracture:    Patient reports she injured her left wrist on September 25, 2021  She fell and landed on her left side  The pain is described as over the radial wrist  Worse with activity, range of motion   Better with splint/immobilization from the ED  No open wounds  Denies numbness and tingling  No history of left wrist injury        Allergies   Allergen Reactions    Penicillins Hives    Sulfa Antibiotics Itching    Levofloxacin      \" Cannot remember\"    Cymbalta [Duloxetine Hcl] Anxiety    Lyrica [Pregabalin] Anxiety    Tramadol Nausea Only        Current Outpatient Medications   Medication Sig Dispense Refill    metoprolol succinate (TOPROL XL) 50 MG extended release tablet TAKE 1 TABLET EVERY DAY 90 tablet 0    amLODIPine (NORVASC) 5 MG tablet TAKE 1 TABLET EVERY 24 HOURS 90 tablet 0    busPIRone (BUSPAR) 5 MG tablet TAKE ONE TO TWO TABLETS BY MOUTH EVERY DAY AS NEEDED FOR SPELL 30 tablet 0    Multiple Vitamins-Minerals (THERAPEUTIC MULTIVITAMIN-MINERALS) tablet Take 1 tablet by mouth nightly      Omega-3 Fatty Acids (FISH OIL) 1000 MG CAPS Take 1,000 mg by mouth nightly Not sure of dosage      NONFORMULARY daily Tumeric-puts 1/4 tsp in shake      Glucosamine-Chondroit-Vit C-Mn (GLUCOSAMINE 1500 COMPLEX) CAPS Take 1,500 mg by mouth daily. (Patient taking differently: Take 1,500 mg by mouth nightly ) 30 capsule 5     No current facility-administered medications for this visit.        Past Medical History:   Diagnosis Date     at 78 heart failure    Cancer Father          at 76 prostate CA       Social History     Socioeconomic History    Marital status:      Spouse name: Janene Santiago Number of children: Not on file    Years of education: Not on file    Highest education level: Not on file   Occupational History    Not on file   Tobacco Use    Smoking status: Never Smoker    Smokeless tobacco: Never Used   Vaping Use    Vaping Use: Never used   Substance and Sexual Activity    Alcohol use: No    Drug use: Never    Sexual activity: Not Currently   Other Topics Concern    Not on file   Social History Narrative    Not on file     Social Determinants of Health     Financial Resource Strain:     Difficulty of Paying Living Expenses: Not on file   Food Insecurity:     Worried About Running Out of Food in the Last Year: Not on file    Shannan of Food in the Last Year: Not on file   Transportation Needs:     Lack of Transportation (Medical): Not on file    Lack of Transportation (Non-Medical):  Not on file   Physical Activity:     Days of Exercise per Week: Not on file    Minutes of Exercise per Session: Not on file   Stress:     Feeling of Stress : Not on file   Social Connections:     Frequency of Communication with Friends and Family: Not on file    Frequency of Social Gatherings with Friends and Family: Not on file    Attends Rastafari Services: Not on file    Active Member of [address] [place] [address] [employer] or Organizations: Not on file    Attends Club or Organization Meetings: Not on file    Marital Status: Not on file   Intimate Partner Violence:     Fear of Current or Ex-Partner: Not on file    Emotionally Abused: Not on file    Physically Abused: Not on file    Sexually Abused: Not on file   Housing Stability:     Unable to Pay for Housing in the Last Year: Not on file    Number of [place] in the Last Year: Not on file    Unstable Housing in the Last Year: Not on file        Vitals:    21 Weight: 176 lb (79.8 kg)   Height: 5' 7\" (1.702 m)       Physical Exam  Constitutional  well-groomed, well-nourished, Body mass index is 27.57 kg/m². 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       HISTORY:   ORDERING SYSTEM PROVIDED HISTORY: fall   TECHNOLOGIST PROVIDED HISTORY:   Reason for exam:->fall   Reason for Exam: fall       FINDINGS:   The regional skeleton was osteopenic.  A subtle oblique intra articular   fracture involves the left radial styloid.  No measurable displacement,   distraction, angulation or overriding were noted.  Degenerative osteo   arthritic changes were noted, moderate between the scaphoid and trapezium and   marked between the trapezium and left 1st metacarpal.           Impression   Osteopenia.       Subtle oblique intra-articular fracture involving the left radial styloid.    No measurable displacement, distraction, angulation or overriding were noted.       Degenerative osteo arthritic changes were noted, moderate to between the   scaphoid and trapezium and marked between the trapezium and left 1st   metacarpal.       Left wrist 3 views repeated today in clinic for serial follow-up imaging - radial styloid fracture remains nondisplaced, no interval displacement. Interval fracture healing present. No other acute osseous abnormalities are seen. Baseline degenerative changes are unchanged. Assessment & Plan:  78 y.o. female who presents with    Diagnosis Orders   1. Closed nondisplaced fracture of styloid process of left radius with routine healing, subsequent encounter  XR WRIST LEFT (MIN 3 VIEWS)   2. Arthritis of carpometacarpal (CMC) joint of left thumb         No orders of the defined types were placed in this encounter.       Discontinue left wrist brace  Advance left hand/wrist activities as tolerated      CMC arthritis:  Ice, Tylenol/NSAIDs as needed for pain  Activity mod  Can purchase off-the-shelf hand-based thumb spica brace for her CMC arthritis as needed  Declined injection  She is not interested in hand referral for possible surgical intervention    Follow-up with any issues    Shruti Bishop MD

## 2021-11-09 NOTE — PROGRESS NOTES
Medicare Annual Wellness Visit  Are Name: Blanche Adame Date: 2021   MRN: <[id number]> Sex: Female   Age: 78 y.o. Ethnicity: Non- / Non    : 1940 Race: White (non-)      Suellen Scott is here for Medicare AWV    Screenings for behavioral, psychosocial and functional/safety risks, and cognitive dysfunction are all negative except as indicated below. These results, as well as other patient data from the [address] form, are documented in Flowsheets linked to this Encounter. Allergies   Allergen Reactions    Penicillins Hives    Sulfa Antibiotics Itching    Levofloxacin      \" Cannot remember\"    Cymbalta [Duloxetine Hcl] Anxiety    Lyrica [Pregabalin] Anxiety    Tramadol Nausea Only         Prior to Visit Medications    Medication Sig Taking? Authorizing Provider   metoprolol succinate (TOPROL XL) 50 MG extended release tablet TAKE 1 TABLET Lefty Gomes MD   amLODIPine (NORVASC) 5 MG tablet TAKE 1 TABLET EVERY 24 HOURS Yes Ivan Muller MD   busPIRone (BUSPAR) 5 MG tablet TAKE ONE TO TWO TABLETS BY MOUTH EVERY DAY AS NEEDED FOR SPELL Yes Iva Ocampo MD   Multiple Vitamins-Minerals (THERAPEUTIC MULTIVITAMIN-MINERALS) tablet Take 1 tablet by mouth nightly Yes Historical Provider, MD   Omega-3 Fatty Acids (FISH OIL) 1000 MG CAPS Take 1,000 mg by mouth nightly Not sure of dosage Yes Historical Provider, MD   NONFORMULARY daily Tumeric-puts 1/4 tsp in shake Yes Historical Provider, MD   Glucosamine-Chondroit-Vit C-Mn (GLUCOSAMINE 1500 COMPLEX) CAPS Take 1,500 mg by mouth daily. Patient taking differently: Take 1,500 mg by mouth nightly  Yes Clarence Kong MD         Past Medical History:   Diagnosis Date    Allergic rhinitis     Arrhythmia 2020 The rhythm was sinus. Average WV interval 0.18, average QRS duration 0.11 [prolonged]. Average daily heart rate 59 ranging from 48 to 87 bpm.Bradycardia present for 21% of test.2. Frequent premature supraventricular ectopic beats total 1,250 consistingof 1,035 isolated PACs, 45 atrial pairs, 21 atrial runs. The longest atrial run was 10 beats HR-102 bpm occurring at 05:12:01. The fastest atrial    Breast mass in female 2012    Cataract 2012    Chest pain 13    normal Lexiscan at [facility]: Dr. Alyssia Godfrey    Closed fracture of first metatarsal bone     Dr. Lawrence Jacinto HTN (hypertension)     Hyperlipidemia     LLQ abdominal pain 2012    Migraine     stopped with menopause    Osteoarthritis     Osteopenia 2018    Pneumonia 5/3/2012    Spinal stenosis     Glenroy Welsh Ventricular ectopy 2020 The rhythm was sinus. Average UT interval 0.18, average QRS duration 0.11 [prolonged]. Average daily heart rate 59 ranging from 48 to 87 bpm.Bradycardia present for 21% of test.2. Frequent premature supraventricular ectopic beats total 1,250 consistingof 1,035 isolated PACs, 45 atrial pairs, 21 atrial runs. The longest atrial run was 10 beats HR-102 bpm occurring at 05:12:01. The fastest atrial       Past Surgical History:   Procedure Laterality Date    BACK SURGERY Left 2020    EXCISION BACK AND LEFT EYEBROW LESIONS, CURETTAGE FOREHEAD LESION, INTERMEDIATE LAYERED REPAIR OF INCISIONS, CURETTAGE FOREHEAD LESION performed by Tri Crowe MD at [facility] Right     bx    CATARACT REMOVAL Bilateral 2012    Dr. Noemi Ballesteros      Dr. Adryan Peters  2006    Spinal stimulator    TOTAL HIP ARTHROPLASTY  11    Left Dr. Olivia Torres at [facility]  12    Right Dr. Olivia Torres         Family History   Problem Relation Age of Onset    Heart Disease Mother          at 80 heart failure    Cancer Father          at 76 prostate CA       CareTeam (Including outside providers/suppliers regularly involved in providing care):   Patient Care Team:  Crystal Da Silva MD as PCP - General (Internal Medicine)  Dutch Adhikari MD as PCP - REHABILITATION [facility] Empaneled Provider    Wt Readings from Last 3 Encounters:   11/09/21 176 lb (79.8 kg)   10/12/21 176 lb (79.8 kg)   09/28/21 176 lb (79.8 kg)      No flowsheet data found. There is no height or weight on file to calculate BMI. Based upon direct observation of the patient, evaluation of cognition reveals recent and remote memory intact. Patient's complete Health Risk Assessment and screening values have been reviewed and are found in Flowsheets. The following problems were reviewed today and where indicated follow up appointments were made and/or referrals ordered. Positive Risk Factor Screenings with Interventions:     Fall Risk:  2 or more falls in past year?: no  Fall with injury in past year?: (!) yes  Fall Risk Interventions:    · Home safety tips provided          General Health and ACP:  General  In general, how would you say your health is?: Very Good  In the past 7 days, have you experienced any of the following? New or Increased Pain, New or Increased Fatigue, Loneliness, Social Isolation, Stress or Anger?: (!) Stress  Do you get the social and emotional support that you need?: Yes  Do you have a Living Will?: (!) No  Advance Directives     Power of  Living Will ACP-Advance Directive ACP-Power of     Not on File Not on File Not on File Not on File      General Health Risk Interventions:  · Stress: patient's comments regarding reasons for stress and/or anger: She recently fractured her wrist, has a grandson living with her and just found out she has sewage problems that will cost her thousands of dollars.    · No Living Will: Advance Care Planning addressed with patient today      Safety:  Safety  Do you have working smoke detectors?: Yes  Have all throw rugs been removed or fastened?: (!) No  Do you have non-slip mats or surfaces in all bathtubs/showers?: Yes  Do all of your stairways have a railing or banister?: Yes  Are your doorways, halls and stairs free of clutter?: Yes  Do you always fasten your seatbelt when you are in a car?: Yes  Safety Interventions:  · Home safety tips provided     Personalized Preventive Plan   Current Health Maintenance Status  Immunization History   Administered Date(s) Administered    COVID-19, Debbrah Neighbor, Primary or Immunocompromised, PF, 100mcg/0.5mL 01/24/2021, 02/21/2021    Influenza Virus Vaccine 10/28/2011    Influenza, High Dose (Fluzone 65 yrs and older) 11/14/2012, 09/16/2016, 11/01/2018    Influenza, Quadv, adjuvanted, 65 yrs +, IM, PF (Fluad) 10/08/2020    Influenza, Triv, inactivated, subunit, adjuvanted, IM (Fluad 65 yrs and older) 01/22/2020    Pneumococcal Conjugate 13-valent ([id number]) 08/11/2015    Pneumococcal Polysaccharide ([id number]) 09/16/2016    Zoster Recombinant (Shingrix) 03/19/2019, 08/01/2019        Health Maintenance   Topic Date Due    DTaP/Tdap/Td vaccine (1 - Tdap) Never done   Ibrahim Annual Wellness Visit (AWV)  Never done    COVID-19 Vaccine (3 - Booster for Moderna series) 08/21/2021    Potassium monitoring  03/29/2022    Creatinine monitoring  03/29/2022    DEXA (modify frequency per FRAX score)  Completed    Flu vaccine  Completed    Shingles Vaccine  Completed    Pneumococcal 65+ years Vaccine  Completed    Hepatitis A vaccine  Aged Out    Hepatitis B vaccine  Aged Out    Hib vaccine  Aged Out    Meningococcal (ACWY) vaccine  Aged Out     Recommendations for Swarm Mobile Due: see orders and patient instructions/AVS.  . Recommended screening schedule for the next 5-10 years is provided to the patient in written form: see Patient Jayant Potter was seen today for medicare awv. Diagnoses and all orders for this visit:    Essential hypertension, benign  On metoprolol and amlodipine. ACP (advance care planning)  Mailing paperwork to her today.    -     MI ADVANCED CARE PLAN FACE TO FACE, 1ST 30MIN [37238]    Routine general medical examination at a health care facility    Paroxysmal atrial fibrillation [facility])               Adelina Lanier is a 78 y.o. female being evaluated by a Virtual Visit (phone) encounter to address concerns as mentioned above. A caregiver was present when appropriate. Due to this being a TeleHealth encounter (During TGJUE-11 public health emergency), evaluation of the following organ systems was limited: Vitals/Constitutional/EENT/Resp/CV/GI//MS/Neuro/Skin/Heme-Lymph-Imm. Pursuant to the emergency declaration under the [facility], [address] [place] [address] wa[facility], Inc[facility] authority and the [employer] Act, this Virtual Visit was conducted with patient's (and/or legal guardian's) consent, to reduce the patient's risk of exposure to COVID-19 and provide necessary medical care. The patient (and/or legal guardian) has also been advised to contact this office for worsening conditions or problems, and seek emergency medical treatment and/or call 911 if deemed necessary. Patient identification was verified at the start of the visit: Yes    Services were provided through phone to substitute for in-person clinic visit. Patient and provider were located at their individual homes. --GISSEL Bautista CNP on 11/9/2021 at 3:26 PM    An electronic signature was used to authenticate this note. Advance Care Planning   Advanced Care Planning: Discussed the patients choices for care and treatment in case of a health event that adversely affects decision-making abilities. Also discussed the patients long-term treatment options. Reviewed with the patient the [facility] of [address] [place] [address] Will Declaration forms  Reviewed the process of designating a competent adult as an Agent (or -in-fact) that could take make health care decisions for the patient if incompetent.  Patient was asked to complete the declaration forms, either acknowledge the forms by a public notary or an eligible witness and provide a signed copy to the practice office.   Time spent (minutes): 5 min

## 2022-08-03 ENCOUNTER — OFFICE VISIT (OUTPATIENT)
Dept: FAMILY MEDICINE CLINIC | Age: 82
End: 2022-08-03
Payer: MEDICARE

## 2022-08-03 VITALS
SYSTOLIC BLOOD PRESSURE: 142 MMHG | OXYGEN SATURATION: 99 % | HEIGHT: 64 IN | HEART RATE: 81 BPM | DIASTOLIC BLOOD PRESSURE: 80 MMHG | BODY MASS INDEX: 29.91 KG/M2 | WEIGHT: 175.2 LBS

## 2022-08-03 DIAGNOSIS — J30.9 ALLERGIC RHINITIS, UNSPECIFIED SEASONALITY, UNSPECIFIED TRIGGER: ICD-10-CM

## 2022-08-03 DIAGNOSIS — R53.83 FATIGUE, UNSPECIFIED TYPE: ICD-10-CM

## 2022-08-03 DIAGNOSIS — M15.9 PRIMARY OSTEOARTHRITIS INVOLVING MULTIPLE JOINTS: ICD-10-CM

## 2022-08-03 DIAGNOSIS — E78.2 MIXED HYPERLIPIDEMIA: ICD-10-CM

## 2022-08-03 DIAGNOSIS — N39.46 MIXED STRESS AND URGE URINARY INCONTINENCE: ICD-10-CM

## 2022-08-03 DIAGNOSIS — I48.0 PAROXYSMAL ATRIAL FIBRILLATION (HCC): ICD-10-CM

## 2022-08-03 DIAGNOSIS — C44.92 SCC (SQUAMOUS CELL CARCINOMA): ICD-10-CM

## 2022-08-03 DIAGNOSIS — I10 ESSENTIAL HYPERTENSION, BENIGN: Primary | ICD-10-CM

## 2022-08-03 DIAGNOSIS — R73.03 PREDIABETES: ICD-10-CM

## 2022-08-03 DIAGNOSIS — Z87.01 HISTORY OF PNEUMONIA: ICD-10-CM

## 2022-08-03 DIAGNOSIS — M85.80 OSTEOPENIA, UNSPECIFIED LOCATION: ICD-10-CM

## 2022-08-03 LAB
A/G RATIO: 1.6 (ref 1.1–2.2)
ALBUMIN SERPL-MCNC: 4.6 G/DL (ref 3.4–5)
ALP BLD-CCNC: 93 U/L (ref 40–129)
ALT SERPL-CCNC: 14 U/L (ref 10–40)
ANION GAP SERPL CALCULATED.3IONS-SCNC: 16 MMOL/L (ref 3–16)
AST SERPL-CCNC: 19 U/L (ref 15–37)
BASOPHILS ABSOLUTE: 0.1 K/UL (ref 0–0.2)
BASOPHILS RELATIVE PERCENT: 1.3 %
BILIRUB SERPL-MCNC: 0.3 MG/DL (ref 0–1)
BUN BLDV-MCNC: 28 MG/DL (ref 7–20)
CALCIUM SERPL-MCNC: 10.1 MG/DL (ref 8.3–10.6)
CHLORIDE BLD-SCNC: 98 MMOL/L (ref 99–110)
CHOLESTEROL, TOTAL: 250 MG/DL (ref 0–199)
CO2: 25 MMOL/L (ref 21–32)
CREAT SERPL-MCNC: 1.1 MG/DL (ref 0.6–1.2)
EOSINOPHILS ABSOLUTE: 0.4 K/UL (ref 0–0.6)
EOSINOPHILS RELATIVE PERCENT: 4.5 %
GFR AFRICAN AMERICAN: 57
GFR NON-AFRICAN AMERICAN: 47
GLUCOSE BLD-MCNC: 120 MG/DL (ref 70–99)
HBA1C MFR BLD: 6.2 %
HCT VFR BLD CALC: 41.5 % (ref 36–48)
HDLC SERPL-MCNC: 53 MG/DL (ref 40–60)
HEMOGLOBIN: 14 G/DL (ref 12–16)
LDL CHOLESTEROL CALCULATED: 159 MG/DL
LYMPHOCYTES ABSOLUTE: 2.9 K/UL (ref 1–5.1)
LYMPHOCYTES RELATIVE PERCENT: 34.3 %
MCH RBC QN AUTO: 30.5 PG (ref 26–34)
MCHC RBC AUTO-ENTMCNC: 33.8 G/DL (ref 31–36)
MCV RBC AUTO: 90.2 FL (ref 80–100)
MONOCYTES ABSOLUTE: 0.7 K/UL (ref 0–1.3)
MONOCYTES RELATIVE PERCENT: 8.5 %
NEUTROPHILS ABSOLUTE: 4.3 K/UL (ref 1.7–7.7)
NEUTROPHILS RELATIVE PERCENT: 51.4 %
PDW BLD-RTO: 14 % (ref 12.4–15.4)
PLATELET # BLD: 336 K/UL (ref 135–450)
PMV BLD AUTO: 8.2 FL (ref 5–10.5)
POTASSIUM SERPL-SCNC: 4.8 MMOL/L (ref 3.5–5.1)
RBC # BLD: 4.6 M/UL (ref 4–5.2)
SODIUM BLD-SCNC: 139 MMOL/L (ref 136–145)
TOTAL PROTEIN: 7.5 G/DL (ref 6.4–8.2)
TRIGL SERPL-MCNC: 190 MG/DL (ref 0–150)
TSH REFLEX FT4: 3.48 UIU/ML (ref 0.27–4.2)
VLDLC SERPL CALC-MCNC: 38 MG/DL
WBC # BLD: 8.4 K/UL (ref 4–11)

## 2022-08-03 PROCEDURE — 99214 OFFICE O/P EST MOD 30 MIN: CPT | Performed by: INTERNAL MEDICINE

## 2022-08-03 PROCEDURE — 36415 COLL VENOUS BLD VENIPUNCTURE: CPT | Performed by: INTERNAL MEDICINE

## 2022-08-03 PROCEDURE — 1090F PRES/ABSN URINE INCON ASSESS: CPT | Performed by: INTERNAL MEDICINE

## 2022-08-03 PROCEDURE — 1123F ACP DISCUSS/DSCN MKR DOCD: CPT | Performed by: INTERNAL MEDICINE

## 2022-08-03 PROCEDURE — G8399 PT W/DXA RESULTS DOCUMENT: HCPCS | Performed by: INTERNAL MEDICINE

## 2022-08-03 PROCEDURE — 83036 HEMOGLOBIN GLYCOSYLATED A1C: CPT | Performed by: INTERNAL MEDICINE

## 2022-08-03 PROCEDURE — G8428 CUR MEDS NOT DOCUMENT: HCPCS | Performed by: INTERNAL MEDICINE

## 2022-08-03 PROCEDURE — 0509F URINE INCON PLAN DOCD: CPT | Performed by: INTERNAL MEDICINE

## 2022-08-03 PROCEDURE — 1036F TOBACCO NON-USER: CPT | Performed by: INTERNAL MEDICINE

## 2022-08-03 PROCEDURE — G8417 CALC BMI ABV UP PARAM F/U: HCPCS | Performed by: INTERNAL MEDICINE

## 2022-08-03 NOTE — PROGRESS NOTES
HPI: Po Torres presents for low up. chronic health issues include obesity, mixed urinary incontinence, osteoarthritis, lumbar laminectomy, hip replacement, hypertension, prediabetes, hyperlipidemia. intermittent a fib flutter,      Left radial fracture 2021. Hypertension. Blood pressures 140 at home. History intermittent A. fib flutter on event monitor. Beta-blocker. nonischemic thallium with normal ejection fraction . Did not use Xarelto. On amlodipine and beta-blocker. Occasional nocturnal awakenings. Positive prediabetes. Denies any apnea. Follows with cardiology. 30-day event monitor ordered but not completed. States wakes with episode and it takes about an hour and a half for it to resolve. Increased fatigue. Difficulty mowing lawn. No shortness of breath chest pain or palpitations with fatigue. Positive back pain. Denies significant depression. Aj Porras History GE reflux no longer having symptoms. Has discontinued her PPI.  .  1 son  accident hiking. Living son with recent MI. Intermittent tearfulness. Not interested in medication. Feels like grief is normal      Lumbar laminectomy with lower extremity paresthesias and spinal stimulator Tylenol. Continued pain. Multiple joint issues. Osteoarthritis      , no FH ovarian, breast. No abnormal Pap smears amenorrheic. Urinary incontinence improved with exercise    Specialist  Dermatology  Cardiology  Eye  Dentist      [facility]    Total hip replacement x 2     Lumbar laminectomy     Cataract repair          swims. . Son  5 from fall at [facility]. 2 grandchildren. Secondary to tobacco.  No alcohol. Not currently exercising. Adelaida Haley recent MI 2022. [employer] 3/week. Friends at pool. FH: +heart failure, prostate cancer, heart disease, OA          - ovarin, breast cancer, mental illness    Review of systems up-to-date eye exams.   Has a dentist.  Occasional wheeze. Swims. Positive palpitations without chest pain. Rare GE reflux no change in stools. No vaginal complaints. Arthralgias. Skin cancers. Dermatology. Rev   Constitutional, ent, CV, respiratory, GI, , joint, skin, allergic and psychiatric ROS reviewed and negative except for above    Allergies   Allergen Reactions    Penicillins Hives    Sulfa Antibiotics Itching    Levofloxacin      \" Cannot remember\"    Cymbalta [Duloxetine Hcl] Anxiety    Lyrica [Pregabalin] Anxiety    Tramadol Nausea Only       Outpatient Medications Marked as Taking for the 8/3/22 encounter (Office Visit) with Charis Curling, MD   Medication Sig Dispense Refill    metoprolol succinate (TOPROL XL) 50 MG extended release tablet TAKE 1 TABLET EVERY DAY 90 tablet 0    amLODIPine (NORVASC) 5 MG tablet TAKE 1 TABLET EVERY 24 HOURS 90 tablet 0    busPIRone (BUSPAR) 5 MG tablet TAKE ONE TO TWO TABLETS BY MOUTH EVERY DAY AS NEEDED FOR SPELL 30 tablet 0    Multiple Vitamins-Minerals (THERAPEUTIC MULTIVITAMIN-MINERALS) tablet Take 1 tablet by mouth nightly      Omega-3 Fatty Acids (FISH OIL) 1000 MG CAPS Take 1,000 mg by mouth nightly Not sure of dosage      Glucosamine-Chondroit-Vit C-Mn (GLUCOSAMINE 1500 COMPLEX) CAPS Take 1,500 mg by mouth daily. (Patient taking differently: Take 1,500 mg by mouth nightly ) 30 capsule 5             Past Medical History:   Diagnosis Date    Allergic rhinitis     Arrhythmia 2/21/2020 2.2020 The rhythm was sinus. Average TN interval 0.18, average QRS duration 0.11 [prolonged]. Average daily heart rate 59 ranging from 48 to 87 bpm.Bradycardia present for 21% of test.2. Frequent premature supraventricular ectopic beats total 1,250 consistingof 1,035 isolated PACs, 45 atrial pairs, 21 atrial runs. The longest atrial run was 10 beats HR-102 bpm occurring at 05:12:01. The fastest atrial    Breast mass in female 4/30/2012    Cataract 4/17/2012    Chest pain 4/29/13    normal Lexiscan at [facility]: Dr. Stefan Villafana Closed fracture of first metatarsal bone     Dr. Vicky Castro    HTN (hypertension)     Hyperlipidemia     LLQ abdominal pain 2012    Migraine     stopped with menopause    Osteoarthritis     Osteopenia 2018    Pneumonia 5/3/2012    Spinal stenosis     Dr. Vani Fried,     Ventricular ectopy 2020 The rhythm was sinus. Average MA interval 0.18, average QRS duration 0.11 [prolonged]. Average daily heart rate 59 ranging from 48 to 87 bpm.Bradycardia present for 21% of test.2. Frequent premature supraventricular ectopic beats total 1,250 consistingof 1,035 isolated PACs, 45 atrial pairs, 21 atrial runs. The longest atrial run was 10 beats HR-102 bpm occurring at 05:12:01. The fastest atrial       Past Surgical History:   Procedure Laterality Date    BACK SURGERY Left 2020    EXCISION BACK AND LEFT EYEBROW LESIONS, CURETTAGE FOREHEAD LESION, INTERMEDIATE LAYERED REPAIR OF INCISIONS, CURETTAGE FOREHEAD LESION performed by Laila Moseley MD at [facility] [address] Right     bx    CATARACT REMOVAL Bilateral 2012    Dr. Hiral Najera      Dr. Dwayne Germain  2006    Spinal stimulator    TOTAL HIP ARTHROPLASTY  11    Left Dr. Vicky Castro at [address]  12    Right Dr. Vicky Castro             Family History   Problem Relation Age of Onset    Heart Disease Mother          at 80 heart failure    Cancer Father          at 76 prostate CA               Objective     BP (!) 142/80   Pulse 81   Ht 5' 4\" (1.626 m)   Wt 175 lb 3.2 oz (79.5 kg)   SpO2 99%   BMI 30.07 kg/m²     @LASTSAO2(3)@    Wt Readings from Last 3 Encounters:   21 176 lb (79.8 kg)   10/12/21 176 lb (79.8 kg)   21 176 lb (79.8 kg)       Physical Exam     NAD alert and cooperative  HEENT: None crowded hypopharynx. TMs are unremarkable. No proptosis. Good upstroke of the carotids no bruits. No adenopathy.   Lungs slight increased MADHU ratio occasional wheeze. Cardiovascular exam regular rate and rhythm. No murmur click. Breast without any dominant masses discharge. Inverted left nipple. Abdomen is benign no hepatosplenomegaly epigastric tenderness or mass. Positive pulses lower extremity no edema. Few varicosities no cords. She is appropriate, well-groomed, good eye contact. Chemistry        Component Value Date/Time     03/29/2021 1031    K 5.2 (H) 03/29/2021 1031     03/29/2021 1031    CO2 28 03/29/2021 1031    BUN 19 03/29/2021 1031    CREATININE 0.7 03/29/2021 1031        Component Value Date/Time    CALCIUM 9.2 03/29/2021 1031    ALKPHOS 93 10/08/2020 1053    AST 19 10/08/2020 1053    ALT 12 10/08/2020 1053    BILITOT 0.3 10/08/2020 1053    BILITOT 0.5 11/16/2011 0000            Lab Results   Component Value Date    WBC 6.2 01/22/2020    HGB 13.2 01/22/2020    HCT 40.0 01/22/2020    MCV 91.5 01/22/2020     01/22/2020     Lab Results   Component Value Date    LABA1C 6.4 03/29/2021     Lab Results   Component Value Date    .0 03/29/2021     Lab Results   Component Value Date    LABA1C 6.4 03/29/2021     No components found for: CHLPL  Lab Results   Component Value Date    TRIG 231 (H) 03/29/2021    TRIG 295 (H) 01/22/2020    TRIG 287 (H) 04/03/2019     Lab Results   Component Value Date    HDL 44 03/29/2021    HDL 49 01/22/2020    HDL 49 04/03/2019     Lab Results   Component Value Date    LDLCALC 114 (H) 03/29/2021    LDLCALC 91 01/22/2020    LDLCALC 121 (H) 04/03/2019     Lab Results   Component Value Date    LABVLDL 46 03/29/2021    LABVLDL 59 01/22/2020    LABVLDL 57 04/03/2019       Old labs and records reviewed or requested  Discussed past lab and studies with patient      Diagnosis Orders   1. Essential hypertension, benign  Comprehensive Metabolic Panel      2. Paroxysmal atrial fibrillation (HCC)  TSH with Reflex to FT4      3. Mixed hyperlipidemia  Lipid Panel      4.  Allergic rhinitis, unspecified seasonality,

## 2022-08-03 NOTE — PATIENT INSTRUCTIONS
Recommend tdap, covid booster  Call your cardiologist for follow-up appointment. Mammogram is due in October 2022. I have put in an order for this. Call your dermatologist for follow-up. Nice job on your urinary exercises. Me know if you are interested in having a medication for chronic pain. I recommend you following every 6 months with your primary care doctor.   Continue with your nice specialist.  The best to you

## 2022-08-05 DIAGNOSIS — R79.89 ELEVATED SERUM CREATININE: Primary | ICD-10-CM

## 2022-08-22 ENCOUNTER — TELEPHONE (OUTPATIENT)
Dept: FAMILY MEDICINE CLINIC | Age: 82
End: 2022-08-22

## 2022-08-22 NOTE — TELEPHONE ENCOUNTER
Just tested positive for covid with home covid test. Wanting to know if there is anything she needs to be doing?  Please call Wally Banks back at [phone]

## 2022-09-12 ENCOUNTER — NURSE ONLY (OUTPATIENT)
Dept: FAMILY MEDICINE CLINIC | Age: 82
End: 2022-09-12
Payer: MEDICARE

## 2022-09-12 DIAGNOSIS — R79.89 ELEVATED SERUM CREATININE: ICD-10-CM

## 2022-09-12 LAB
ANION GAP SERPL CALCULATED.3IONS-SCNC: 14 MMOL/L (ref 3–16)
BACTERIA: ABNORMAL /HPF
BILIRUBIN URINE: NEGATIVE
BLOOD, URINE: NEGATIVE
BUN BLDV-MCNC: 17 MG/DL (ref 7–20)
CALCIUM SERPL-MCNC: 9.4 MG/DL (ref 8.3–10.6)
CHLORIDE BLD-SCNC: 102 MMOL/L (ref 99–110)
CLARITY: CLEAR
CO2: 25 MMOL/L (ref 21–32)
COLOR: YELLOW
CREAT SERPL-MCNC: 0.9 MG/DL (ref 0.6–1.2)
EPITHELIAL CELLS, UA: 11 /HPF (ref 0–5)
GFR AFRICAN AMERICAN: >60
GFR NON-AFRICAN AMERICAN: 60
GLUCOSE BLD-MCNC: 120 MG/DL (ref 70–99)
GLUCOSE URINE: NEGATIVE MG/DL
HYALINE CASTS: 0 /LPF (ref 0–8)
KETONES, URINE: NEGATIVE MG/DL
LEUKOCYTE ESTERASE, URINE: ABNORMAL
MICROSCOPIC EXAMINATION: YES
NITRITE, URINE: NEGATIVE
PH UA: 8 (ref 5–8)
POTASSIUM SERPL-SCNC: 4.8 MMOL/L (ref 3.5–5.1)
PROTEIN UA: NEGATIVE MG/DL
RBC UA: 1 /HPF (ref 0–4)
SODIUM BLD-SCNC: 141 MMOL/L (ref 136–145)
SPECIFIC GRAVITY UA: 1.02 (ref 1–1.03)
URINE TYPE: ABNORMAL
UROBILINOGEN, URINE: 0.2 E.U./DL
WBC UA: 8 /HPF (ref 0–5)

## 2022-09-12 PROCEDURE — 36415 COLL VENOUS BLD VENIPUNCTURE: CPT | Performed by: INTERNAL MEDICINE

## 2022-09-12 NOTE — PROGRESS NOTES
Labs drawn via Right Arm using 21 G x 1 1/2\" drawing needle on the first attempt without difficulty. Manual pressure applied to site upon completion of venipuncture without any bleeding, Band-aid applied over site.     Patient tolerated lab procedure well: Yes    Tubes used:  1 SST

## 2022-09-19 DIAGNOSIS — I10 ESSENTIAL HYPERTENSION, BENIGN: ICD-10-CM

## 2022-09-19 RX ORDER — AMLODIPINE BESYLATE 5 MG/1
TABLET ORAL
Qty: 90 TABLET | Refills: 0 | Status: SHIPPED | OUTPATIENT
Start: 2022-09-19

## 2022-09-19 RX ORDER — METOPROLOL SUCCINATE 50 MG/1
TABLET, EXTENDED RELEASE ORAL
Qty: 90 TABLET | Refills: 0 | Status: SHIPPED | OUTPATIENT
Start: 2022-09-19

## 2022-09-21 RX ORDER — BUSPIRONE HYDROCHLORIDE 5 MG/1
TABLET ORAL
Qty: 60 TABLET | Refills: 0 | Status: SHIPPED | OUTPATIENT
Start: 2022-09-21

## 2022-09-21 NOTE — TELEPHONE ENCOUNTER
Vern Thorpe states she does take them but not very often but likes to have them for when she needs them.

## 2022-10-20 ENCOUNTER — TELEPHONE (OUTPATIENT)
Dept: FAMILY MEDICINE CLINIC | Age: 82
End: 2022-10-20

## 2022-10-20 DIAGNOSIS — I10 ESSENTIAL HYPERTENSION, BENIGN: ICD-10-CM

## 2022-11-17 NOTE — PROGRESS NOTES
Meredith Ellsworth (:  1940) is a 78 y.o. female,New patient, here for evaluation of the following chief complaint(s):  Established New Doctor (Pt wants to discuss B/P)    chronic health issues include obesity, mixed urinary incontinence, osteoarthritis, lumbar laminectomy, hip replacement, hypertension, prediabetes, hyperlipidemia. intermittent a fib flutter. Left radial fracture 2021. History GE reflux no longer having symptoms, of her PPI.  .  1 son  accident hiking. Living son with recent MI.  h/o depressed- Not interested in medication. Feels like grief is normal     ----Lumbar laminectomy with lower extremity paresthesias and spinal stimulator. B/l hip replaced- - . no longer on a cane or a walker. Does mowh her yard. Dexa -  osteopenia, does exercise including pilates machine. Take vitamin D and calcium. ASSESSMENT/PLAN:  1. Essential hypertension, benign-currently patient is taking metoprolol 50 mg and amlodipine 10 mg. She had noted some fluctuation in her blood pressure,  (135- 139 at home)advised her to take amlodipine as 5 in the morning and 5 in the evening. Along with metoprolol 50 mg daily. Counseled on low-salt diet and regular exercise. JNC 7 recommends treating all patients with uncomplicated hypertension, including those aged 72 to 78 years, to a target blood pressure (BP) of <140/90 mm Hg. For patients aged ? 80 years, most experts recommend a systolic BP goal of 499-955 mm Hg, to minimize medication side effects. Patient is already at her goal blood pressure. -     amLODIPine (NORVASC) 5 MG tablet; Take 2 tablets (1 morning and 1 evening) daily. , Disp-180 tablet, R-0Normal  2. At high risk for falls-patient reports of having numbness in her left lateral lower leg after her back surgery. She has been off her cane and walker since her bilateral hip surgery. Counseled her regarding use of cane.     3. Paroxysmal atrial fibrillation (HCC)-patient is seeing a cardiologist, she has a history of  Atrial runs and SVTs for few seconds seen in Holter monitor. Denies of being on blood thinner. Currently rate controlled being on metoprolol    Pulse Readings from Last 3 Encounters:   11/18/22 56   08/03/22 81   09/25/21 62         4. Flu vaccine need  -     Influenza, FLUAD, (age 72 y+), IM, Preservative Free, 0.5 mL  5. Mixed hyperlipidemia-uncontrolled, will start on Crestor, counseled on diet management. -     rosuvastatin (CRESTOR) 5 MG tablet; Take 1 tablet by mouth nightly, Disp-90 tablet, R-2Normal    Return in about 3 months (around 2/18/2023) for Blood work (luipid), HTN. Subjective   SUBJECTIVE/OBJECTIVE:  HPI    Review of Systems   Constitutional:  Negative for chills, fatigue and fever. HENT:  Negative for congestion, ear pain, rhinorrhea and sore throat. Eyes:  Negative for discharge, redness and visual disturbance. Respiratory:  Negative for cough and chest tightness. Cardiovascular:  Negative for chest pain, palpitations and leg swelling. Gastrointestinal:  Negative for abdominal pain, nausea and vomiting. Endocrine: Negative. Genitourinary:  Negative for dysuria. Musculoskeletal:  Negative for back pain and gait problem. Skin: Negative. Neurological:  Negative for syncope, facial asymmetry, speech difficulty, light-headedness and headaches. Objective   Physical Exam  Vitals reviewed. Constitutional:       Appearance: Normal appearance. HENT:      Head: Normocephalic. Eyes:      Extraocular Movements: Extraocular movements intact. Pupils: Pupils are equal, round, and reactive to light. Cardiovascular:      Rate and Rhythm: Normal rate. Heart sounds: Normal heart sounds. No murmur heard. Pulmonary:      Effort: Pulmonary effort is normal.      Breath sounds: Normal breath sounds. Abdominal:      General: Bowel sounds are normal.      Palpations: Abdomen is soft. Musculoskeletal:         General: Deformity (left ring finger amputation.) present. Normal range of motion. Skin:     General: Skin is warm. Neurological:      General: No focal deficit present. Mental Status: She is alert and oriented to person, place, and time. Mental status is at baseline. Psychiatric:         Mood and Affect: Mood normal.                An electronic signature was used to authenticate this note. --Allison Rudolph MD     On the basis of positive falls risk screening, assessment and plan is as follows: home safety tips provided. Aggreable to use.

## 2022-11-18 ENCOUNTER — OFFICE VISIT (OUTPATIENT)
Dept: INTERNAL MEDICINE CLINIC | Age: 82
End: 2022-11-18
Payer: MEDICARE

## 2022-11-18 VITALS
WEIGHT: 175.6 LBS | TEMPERATURE: 97.5 F | DIASTOLIC BLOOD PRESSURE: 66 MMHG | SYSTOLIC BLOOD PRESSURE: 160 MMHG | BODY MASS INDEX: 30.14 KG/M2 | HEART RATE: 56 BPM

## 2022-11-18 DIAGNOSIS — Z23 FLU VACCINE NEED: ICD-10-CM

## 2022-11-18 DIAGNOSIS — Z91.81 AT HIGH RISK FOR FALLS: ICD-10-CM

## 2022-11-18 DIAGNOSIS — E78.2 MIXED HYPERLIPIDEMIA: ICD-10-CM

## 2022-11-18 DIAGNOSIS — I10 ESSENTIAL HYPERTENSION, BENIGN: Primary | ICD-10-CM

## 2022-11-18 DIAGNOSIS — I48.0 PAROXYSMAL ATRIAL FIBRILLATION (HCC): ICD-10-CM

## 2022-11-18 PROCEDURE — 99204 OFFICE O/P NEW MOD 45 MIN: CPT | Performed by: STUDENT IN AN ORGANIZED HEALTH CARE EDUCATION/TRAINING PROGRAM

## 2022-11-18 PROCEDURE — 1123F ACP DISCUSS/DSCN MKR DOCD: CPT | Performed by: STUDENT IN AN ORGANIZED HEALTH CARE EDUCATION/TRAINING PROGRAM

## 2022-11-18 PROCEDURE — G8484 FLU IMMUNIZE NO ADMIN: HCPCS | Performed by: STUDENT IN AN ORGANIZED HEALTH CARE EDUCATION/TRAINING PROGRAM

## 2022-11-18 PROCEDURE — 1090F PRES/ABSN URINE INCON ASSESS: CPT | Performed by: STUDENT IN AN ORGANIZED HEALTH CARE EDUCATION/TRAINING PROGRAM

## 2022-11-18 PROCEDURE — 90694 VACC AIIV4 NO PRSRV 0.5ML IM: CPT | Performed by: STUDENT IN AN ORGANIZED HEALTH CARE EDUCATION/TRAINING PROGRAM

## 2022-11-18 PROCEDURE — 3078F DIAST BP <80 MM HG: CPT | Performed by: STUDENT IN AN ORGANIZED HEALTH CARE EDUCATION/TRAINING PROGRAM

## 2022-11-18 PROCEDURE — 3074F SYST BP LT 130 MM HG: CPT | Performed by: STUDENT IN AN ORGANIZED HEALTH CARE EDUCATION/TRAINING PROGRAM

## 2022-11-18 PROCEDURE — G8399 PT W/DXA RESULTS DOCUMENT: HCPCS | Performed by: STUDENT IN AN ORGANIZED HEALTH CARE EDUCATION/TRAINING PROGRAM

## 2022-11-18 PROCEDURE — G8417 CALC BMI ABV UP PARAM F/U: HCPCS | Performed by: STUDENT IN AN ORGANIZED HEALTH CARE EDUCATION/TRAINING PROGRAM

## 2022-11-18 PROCEDURE — G8427 DOCREV CUR MEDS BY ELIG CLIN: HCPCS | Performed by: STUDENT IN AN ORGANIZED HEALTH CARE EDUCATION/TRAINING PROGRAM

## 2022-11-18 PROCEDURE — G0008 ADMIN INFLUENZA VIRUS VAC: HCPCS | Performed by: STUDENT IN AN ORGANIZED HEALTH CARE EDUCATION/TRAINING PROGRAM

## 2022-11-18 PROCEDURE — 1036F TOBACCO NON-USER: CPT | Performed by: STUDENT IN AN ORGANIZED HEALTH CARE EDUCATION/TRAINING PROGRAM

## 2022-11-18 RX ORDER — ROSUVASTATIN CALCIUM 5 MG/1
5 TABLET, COATED ORAL NIGHTLY
Qty: 90 TABLET | Refills: 2 | Status: SHIPPED | OUTPATIENT
Start: 2022-11-18

## 2022-11-18 RX ORDER — AMLODIPINE BESYLATE 5 MG/1
TABLET ORAL
Qty: 180 TABLET | Refills: 0 | Status: SHIPPED | OUTPATIENT
Start: 2022-11-18 | End: 2022-11-18

## 2022-11-18 RX ORDER — AMLODIPINE BESYLATE 5 MG/1
TABLET ORAL
Qty: 180 TABLET | Refills: 0 | Status: SHIPPED | OUTPATIENT
Start: 2022-11-18

## 2022-11-18 RX ORDER — ROSUVASTATIN CALCIUM 5 MG/1
5 TABLET, COATED ORAL NIGHTLY
Qty: 90 TABLET | Refills: 2 | Status: SHIPPED | OUTPATIENT
Start: 2022-11-18 | End: 2022-11-18

## 2022-11-20 ASSESSMENT — ENCOUNTER SYMPTOMS
SORE THROAT: 0
EYE REDNESS: 0
COUGH: 0
CHEST TIGHTNESS: 0
BACK PAIN: 0
EYE DISCHARGE: 0
RHINORRHEA: 0
NAUSEA: 0
VOMITING: 0
ABDOMINAL PAIN: 0

## 2022-12-28 ENCOUNTER — HOSPITAL ENCOUNTER (OUTPATIENT)
Dept: WOMENS IMAGING | Age: 82
Discharge: HOME | End: 2022-12-28
Payer: MEDICARE

## 2022-12-28 DIAGNOSIS — Z12.31 BREAST CANCER SCREENING BY MAMMOGRAM: ICD-10-CM

## 2022-12-28 PROCEDURE — 77067 SCR MAMMO BI INCL CAD: CPT

## 2023-03-01 ASSESSMENT — ENCOUNTER SYMPTOMS
EYE DISCHARGE: 0
CHEST TIGHTNESS: 0
SORE THROAT: 0
RHINORRHEA: 0
VOMITING: 0
COUGH: 0
EYE REDNESS: 0
BACK PAIN: 0
ABDOMINAL PAIN: 0
NAUSEA: 0

## 2023-03-01 NOTE — PROGRESS NOTES
Vera Lugo (:  1940) is a 78 y.o. female,New patient, here for evaluation of the following chief complaint(s):  Blood Pressure Check    chronic health issues include obesity, mixed urinary incontinence, osteoarthritis, lumbar laminectomy, hip replacement, hypertension, prediabetes, hyperlipidemia. intermittent a fib flutter, CKD 3. Left radial fracture 2021. History GE reflux no longer having symptoms, off her PPI.  .  1 son  accident hiking. Living son with recent MI.  h/o depressed- Not interested in medication. Feels like grief is normal     ----Lumbar laminectomy with lower extremity paresthesias and spinal stimulator. B/l hip replaced- - . no longer on a cane or a walker. Does mowh her yard. ---- Paroxysmal atrial fibrillation (HCC)-patient is seeing a cardiologist, she has a history of  Atrial runs and SVTs for few seconds seen in Holter monitor. Denies of being on blood thinner. Currently rate controlled being on metoprolol     Dexa -  osteopenia, does exercise including [place] [place] machine. Take vitamin D and calcium. ASSESSMENT/PLAN:    1. Essential hypertension, benign goal less than 140-145 to minimize the medication adverse effect. At home, her blood pressure readings are in 110s to 130s/60s. We will continue with amlodipine 5 in the morning and 5 in the evening along with metoprolol 50 mg daily. Continue with low-salt diet and regular exercise. -     Basic Metabolic Panel; Future  2. Acute cough-at night, likely allergy related. Will prescribe her with cetirizine to be taken at nighttime. -     cetirizine (ZYRTEC) 5 MG tablet; Take 1 tablet by mouth daily, Disp-30 tablet, R-0Normal  -     albuterol sulfate HFA (VENTOLIN HFA) 108 (90 Base) MCG/ACT inhaler; Inhale 2 puffs into the lungs 4 times daily as needed for Wheezing, Disp-18 g, R-0Normal  3. Mixed hyperlipidemia-continue with Crestor 5 mg daily, will repeat labs.   - Lipid, Fasting; Future  4. Chest congestion-lung with episode of wheezing at nighttime. Prescription called in  -     albuterol sulfate HFA (VENTOLIN HFA) 108 (90 Base) MCG/ACT inhaler; Inhale 2 puffs into the lungs 4 times daily as needed for Wheezing, Disp-18 g, R-0Normal      Return in about 6 months (around 9/2/2023). Subjective   SUBJECTIVE/OBJECTIVE:  Patient has been experiencing nighttime cough, congestion since Monday. It is getting better. Chest Congestion  This is a new problem. Episode onset: Since monday. Episode frequency: night time. The problem has been gradually improving. Associated symptoms include congestion and fatigue (single episode on Monday). Pertinent negatives include no abdominal pain, chest pain, chills, coughing, fever, headaches, nausea, sore throat or vomiting. Review of Systems   Constitutional:  Positive for fatigue (single episode on Monday). Negative for chills and fever. HENT:  Positive for congestion. Negative for ear pain, rhinorrhea and sore throat. Eyes:  Negative for discharge, redness and visual disturbance. Respiratory:  Negative for cough and chest tightness. Cardiovascular:  Negative for chest pain, palpitations and leg swelling. Gastrointestinal:  Negative for abdominal pain, nausea and vomiting. Endocrine: Negative. Genitourinary:  Negative for dysuria. Musculoskeletal:  Negative for back pain and gait problem. Skin: Negative. Neurological:  Negative for syncope, facial asymmetry, speech difficulty, light-headedness and headaches. Objective   Physical Exam  Vitals reviewed. Constitutional:       Appearance: Normal appearance. HENT:      Head: Normocephalic. Eyes:      Extraocular Movements: Extraocular movements intact. Pupils: Pupils are equal, round, and reactive to light. Cardiovascular:      Rate and Rhythm: Normal rate. Heart sounds: Normal heart sounds. No murmur heard.   Pulmonary:      Effort: Pulmonary effort is normal.      Breath sounds: Normal breath sounds. Abdominal:      General: Bowel sounds are normal.      Palpations: Abdomen is soft. Musculoskeletal:         General: Deformity (left ring finger amputation.) present. Normal range of motion. Skin:     General: Skin is warm. Neurological:      General: No focal deficit present. Mental Status: She is alert and oriented to person, place, and time. Mental status is at baseline. Psychiatric:         Mood and Affect: Mood normal.                An electronic signature was used to authenticate this note. --Terry Valencia MD     On the basis of positive falls risk screening, assessment and plan is as follows: home safety tips provided. Aggreable to use.

## 2023-03-02 ENCOUNTER — OFFICE VISIT (OUTPATIENT)
Dept: INTERNAL MEDICINE CLINIC | Age: 83
End: 2023-03-02

## 2023-03-02 VITALS
WEIGHT: 174.2 LBS | SYSTOLIC BLOOD PRESSURE: 145 MMHG | BODY MASS INDEX: 29.9 KG/M2 | TEMPERATURE: 97.2 F | HEART RATE: 58 BPM | DIASTOLIC BLOOD PRESSURE: 80 MMHG

## 2023-03-02 DIAGNOSIS — R09.89 CHEST CONGESTION: ICD-10-CM

## 2023-03-02 DIAGNOSIS — R05.1 ACUTE COUGH: ICD-10-CM

## 2023-03-02 DIAGNOSIS — E78.2 MIXED HYPERLIPIDEMIA: ICD-10-CM

## 2023-03-02 DIAGNOSIS — I10 ESSENTIAL HYPERTENSION, BENIGN: Primary | ICD-10-CM

## 2023-03-02 RX ORDER — ALBUTEROL SULFATE 90 UG/1
2 AEROSOL, METERED RESPIRATORY (INHALATION) 4 TIMES DAILY PRN
Qty: 18 G | Refills: 0 | Status: SHIPPED | OUTPATIENT
Start: 2023-03-02

## 2023-03-02 RX ORDER — CETIRIZINE HYDROCHLORIDE 5 MG/1
5 TABLET ORAL DAILY
Qty: 30 TABLET | Refills: 0 | Status: SHIPPED | OUTPATIENT
Start: 2023-03-02 | End: 2023-04-01

## 2023-03-02 SDOH — ECONOMIC STABILITY: INCOME INSECURITY: HOW HARD IS IT FOR YOU TO PAY FOR THE VERY BASICS LIKE FOOD, HOUSING, MEDICAL CARE, AND HEATING?: NOT VERY HARD

## 2023-03-02 SDOH — ECONOMIC STABILITY: HOUSING INSECURITY
IN THE LAST 12 MONTHS, WAS THERE A TIME WHEN YOU DID NOT HAVE A STEADY PLACE TO SLEEP OR SLEPT IN A SHELTER (INCLUDING NOW)?: NO

## 2023-03-02 SDOH — ECONOMIC STABILITY: FOOD INSECURITY: WITHIN THE PAST 12 MONTHS, YOU WORRIED THAT YOUR FOOD WOULD RUN OUT BEFORE YOU GOT MONEY TO BUY MORE.: NEVER TRUE

## 2023-03-02 SDOH — ECONOMIC STABILITY: FOOD INSECURITY: WITHIN THE PAST 12 MONTHS, THE FOOD YOU BOUGHT JUST DIDN'T LAST AND YOU DIDN'T HAVE MONEY TO GET MORE.: NEVER TRUE

## 2023-03-02 ASSESSMENT — PATIENT HEALTH QUESTIONNAIRE - PHQ9
SUM OF ALL RESPONSES TO PHQ QUESTIONS 1-9: 0
1. LITTLE INTEREST OR PLEASURE IN DOING THINGS: 0
SUM OF ALL RESPONSES TO PHQ QUESTIONS 1-9: 0
SUM OF ALL RESPONSES TO PHQ QUESTIONS 1-9: 0
2. FEELING DOWN, DEPRESSED OR HOPELESS: 0
SUM OF ALL RESPONSES TO PHQ9 QUESTIONS 1 & 2: 0
SUM OF ALL RESPONSES TO PHQ QUESTIONS 1-9: 0

## 2023-03-07 ENCOUNTER — NURSE ONLY (OUTPATIENT)
Dept: INTERNAL MEDICINE CLINIC | Age: 83
End: 2023-03-07
Payer: MEDICARE

## 2023-03-07 DIAGNOSIS — E78.2 MIXED HYPERLIPIDEMIA: ICD-10-CM

## 2023-03-07 DIAGNOSIS — I10 ESSENTIAL HYPERTENSION, BENIGN: ICD-10-CM

## 2023-03-07 LAB
ANION GAP SERPL CALCULATED.3IONS-SCNC: 10 MMOL/L (ref 3–16)
BUN BLDV-MCNC: 17 MG/DL (ref 7–20)
CALCIUM SERPL-MCNC: 9.5 MG/DL (ref 8.3–10.6)
CHLORIDE BLD-SCNC: 103 MMOL/L (ref 99–110)
CHOLESTEROL, FASTING: 195 MG/DL (ref 0–199)
CO2: 27 MMOL/L (ref 21–32)
CREAT SERPL-MCNC: 0.9 MG/DL (ref 0.6–1.2)
GFR SERPL CREATININE-BSD FRML MDRD: >60 ML/MIN/{1.73_M2}
GLUCOSE BLD-MCNC: 125 MG/DL (ref 70–99)
HDLC SERPL-MCNC: 44 MG/DL (ref 40–60)
LDL CHOLESTEROL CALCULATED: 120 MG/DL
POTASSIUM SERPL-SCNC: 5.5 MMOL/L (ref 3.5–5.1)
SODIUM BLD-SCNC: 140 MMOL/L (ref 136–145)
TRIGLYCERIDE, FASTING: 153 MG/DL (ref 0–150)
VLDLC SERPL CALC-MCNC: 31 MG/DL

## 2023-03-07 PROCEDURE — 36415 COLL VENOUS BLD VENIPUNCTURE: CPT | Performed by: STUDENT IN AN ORGANIZED HEALTH CARE EDUCATION/TRAINING PROGRAM

## 2023-03-09 ENCOUNTER — OFFICE VISIT (OUTPATIENT)
Dept: INTERNAL MEDICINE CLINIC | Age: 83
End: 2023-03-09

## 2023-03-09 DIAGNOSIS — Z00.00 MEDICARE ANNUAL WELLNESS VISIT, SUBSEQUENT: Primary | ICD-10-CM

## 2023-03-09 ASSESSMENT — LIFESTYLE VARIABLES
HOW OFTEN DO YOU HAVE A DRINK CONTAINING ALCOHOL: NEVER
HOW MANY STANDARD DRINKS CONTAINING ALCOHOL DO YOU HAVE ON A TYPICAL DAY: PATIENT DOES NOT DRINK

## 2023-03-09 ASSESSMENT — PATIENT HEALTH QUESTIONNAIRE - PHQ9
SUM OF ALL RESPONSES TO PHQ QUESTIONS 1-9: 0
SUM OF ALL RESPONSES TO PHQ9 QUESTIONS 1 & 2: 0
2. FEELING DOWN, DEPRESSED OR HOPELESS: 0
SUM OF ALL RESPONSES TO PHQ QUESTIONS 1-9: 0
1. LITTLE INTEREST OR PLEASURE IN DOING THINGS: 0

## 2023-03-09 NOTE — PATIENT INSTRUCTIONS
Preventing Falls: Care Instructions  Overview     Getting around your home safely can be a challenge if you have injuries or health problems that make it easy for you to fall. Loose rugs and furniture in walkways are among the dangers for many older people who have problems walking or who have poor eyesight. People who have conditions such as arthritis, osteoporosis, or dementia also have to be careful not to fall. You can make your home safer with a few simple measures. Follow-up care is a key part of your treatment and safety. Be sure to make and go to all appointments, and call your doctor if you are having problems. It's also a good idea to know your test results and keep a list of the medicines you take. How can you care for yourself at home? Taking care of yourself  Exercise regularly to improve your strength, muscle tone, and balance. Walk if you can. Swimming may be a good choice if you cannot walk easily. Have your vision and hearing checked each year or any time you notice a change. If you have trouble seeing and hearing, you might not be able to avoid objects and could lose your balance. Know the side effects of the medicines you take. Ask your doctor or pharmacist whether the medicines you take can affect your balance. Sleeping pills or sedatives can affect your balance. Limit the amount of alcohol you drink. Alcohol can impair your balance and other senses. Ask your doctor whether calluses or corns on your feet need to be removed. If you wear loose-fitting shoes because of calluses or corns, you can lose your balance and fall. Talk to your doctor if you have numbness in your feet. You may get dizzy if you do not drink enough water. To prevent dehydration, drink plenty of fluids. Choose water and other clear liquids. If you have kidney, heart, or liver disease and have to limit fluids, talk with your doctor before you increase the amount of fluids you drink.   Preventing falls at home  Remove raised doorway thresholds, throw rugs, and clutter. Repair loose carpet or raised areas in the floor. Move furniture and electrical cords to keep them out of walking paths. Use nonskid floor wax, and wipe up spills right away, especially on ceramic tile floors. If you use a walker or cane, put rubber tips on it. If you use crutches, clean the bottoms of them regularly with an abrasive pad, such as steel wool. Keep your house well lit, especially stairways, porches, and outside walkways. Use night-lights in areas such as hallways and bathrooms. Add extra light switches or use remote switches (such as switches that go on or off when you clap your hands) to make it easier to turn lights on if you have to get up during the night. Install sturdy handrails on stairways. Move items in your cabinets so that the things you use a lot are on the lower shelves (about waist level). Keep a cordless phone and a flashlight with new batteries by your bed. If possible, put a phone in each of the main rooms of your house, or carry a cell phone in case you fall and cannot reach a phone. Or, you can wear a device around your neck or wrist. You push a button that sends a signal for help. Wear low-heeled shoes that fit well and give your feet good support. Use footwear with nonskid soles. Check the heels and soles of your shoes for wear. Repair or replace worn heels or soles. Do not wear socks without shoes on smooth floors, such as wood. Walk on the grass when the sidewalks are slippery. If you live in an area that gets snow and ice in the winter, sprinkle salt on slippery steps and sidewalks. Or ask a family member or friend to do this for you. Preventing falls in the bath  Install grab bars and nonskid mats inside and outside your shower or tub and near the toilet and sinks. Use shower chairs and bath benches. Use a hand-held shower head that will allow you to sit while showering.   Get into a tub or shower by putting the weaker leg in first. Get out of a tub or shower with your strong side first.  Repair loose toilet seats and consider installing a raised toilet seat to make getting on and off the toilet easier. Keep your bathroom door unlocked while you are in the shower. Where can you learn more? Go to http://www.thomas.com/ and enter G117 to learn more about \"Preventing Falls: Care Instructions. \"  Current as of: May 4, 2022               Content Version: 13.5  © 0990-4835 Healthwise, [employer]. Care instructions adapted under license by [facility] ([facility]). If you have questions about a medical condition or this instruction, always ask your healthcare professional. [address] any warranty or liability for your use of this information. Fatigue: Care Instructions  Your Care Instructions     Fatigue is a feeling of tiredness, exhaustion, or lack of energy. You may feel fatigue because of too much or not enough activity. It can also come from stress, lack of sleep, boredom, and poor diet. Many medical problems, such as viral infections, can cause fatigue. Emotional problems, especially depression, are often the cause of fatigue. Fatigue is most often a symptom of another problem. Treatment for fatigue depends on the cause. For example, if you have fatigue because you have a certain health problem, treating this problem also treats your fatigue. If depression or anxiety is the cause, treatment may help. Follow-up care is a key part of your treatment and safety. Be sure to make and go to all appointments, and call your doctor if you are having problems. It's also a good idea to know your test results and keep a list of the medicines you take. How can you care for yourself at home? Get regular exercise. But don't overdo it. Go back and forth between rest and exercise. Get plenty of rest.  Eat a healthy diet.  Do not skip meals, especially breakfast.  Reduce your use of caffeine, tobacco, and alcohol. Caffeine is most often found in coffee, tea, cola drinks, and chocolate. Limit medicines that can cause fatigue. This includes tranquilizers and cold and allergy medicines. When should you call for help? Watch closely for changes in your health, and be sure to contact your doctor if:    You have new symptoms such as fever or a rash.     Your fatigue gets worse.     You have been feeling down, depressed, or hopeless. Or you may have lost interest in things that you usually enjoy.     You are not getting better as expected. Where can you learn more? Go to http://www.woods.com/ and enter N999 to learn more about \"Fatigue: Care Instructions. \"  Current as of: February 9, 2022               Content Version: 13.5  © 5494-8989 [employer]. Care instructions adapted under license by [facility] ([facility]). If you have questions about a medical condition or this instruction, always ask your healthcare professional. [place] [address] any warranty or liability for your use of this information. Learning About Dental Care for Older Adults  Dental care for older adults: Overview  Dental care for older people is much the same as for younger adults. But older adults do have concerns that younger adults do not. Older adults may have problems with gum disease and decay on the roots of their teeth. They may need missing teeth replaced or broken fillings fixed. Or they may have dentures that need to be cared for. Some older adults may have trouble holding a toothbrush. You can help remind the person you are caring for to brush and floss their teeth or to clean their dentures. In some cases, you may need to do the brushing and other dental care tasks. People who have trouble using their hands or who have dementia may need this extra help. How can you help with dental care?   Normal dental care  To keep the teeth and gums healthy:  Brush the teeth with fluoride toothpaste twice a day--in the morning and at night--and floss at least once a day. Plaque can quickly build up on the teeth of older adults. Watch for the signs of gum disease. These signs include gums that bleed after brushing or after eating hard foods, such as apples. See a dentist regularly. Many experts recommend checkups every 6 months. Keep the dentist up to date on any new medications the person is taking. Encourage a balanced diet that includes whole grains, vegetables, and fruits, and that is low in saturated fat and sodium. Encourage the person you're caring for not to use tobacco products. They can affect dental and general health. Many older adults have a fixed income and feel that they can't afford dental care. But most towns and cities have programs in which dentists help older adults by lowering fees. Contact your area's public health offices or  for information about dental care in your area. Using a toothbrush  Older adults with arthritis sometimes have trouble brushing their teeth because they can't easily hold the toothbrush. Their hands and fingers may be stiff, painful, or weak. If this is the case, you can: Offer an electric toothbrush. Enlarge the handle of a non-electric toothbrush by wrapping a sponge, an elastic bandage, or adhesive tape around it. Push the toothbrush handle through a ball made of rubber or soft foam.  Make the handle longer and thicker by taping Popsicle sticks or tongue depressors to it. You may also be able to buy special toothbrushes, toothpaste dispensers, and floss holders. Your doctor may recommend a soft-bristle toothbrush if the person you care for bleeds easily. Bleeding can happen because of a health problem or from certain medicines. A toothpaste for sensitive teeth may help if the person you care for has sensitive teeth. How do you brush and floss someone's teeth?   If the person you are caring for has a hard time cleaning their teeth on their own, you may need to brush and floss their teeth for them. It may be easiest to have the person sit and face away from you, and to sit or stand behind them. That way you can steady their head against your arm as you reach around to floss and brush their teeth. Choose a place that has good lighting and is comfortable for both of you. Before you begin, gather your supplies. You will need gloves, floss, a toothbrush, and a container to hold water if you are not near a sink. Wash and dry your hands well and put on gloves. Start by flossing:  Gently work a piece of floss between each of the teeth toward the gums. A plastic flossing tool may make this easier, and they are available at most [facility]. Curve the floss around each tooth into a U-shape and gently slide it under the gum line. Move the floss firmly up and down several times to scrape off the plaque. After you've finished flossing, throw away the used floss and begin brushing:  Wet the brush and apply toothpaste. Place the brush at a 45-degree angle where the teeth meet the gums. Press firmly, and move the brush in small circles over the surface of the teeth. Be careful not to brush too hard. Vigorous brushing can make the gums pull away from the teeth and can scratch the tooth enamel. Brush all surfaces of the teeth, on the tongue side and on the cheek side. Pay special attention to the front teeth and all surfaces of the back teeth. Brush chewing surfaces with short back-and-forth strokes. After you've finished, help the person rinse the remaining toothpaste from their mouth. Where can you learn more? Go to http://www.woods.com/ and enter F944 to learn more about \"Learning About Dental Care for Older Adults. \"  Current as of: June 16, 2022               Content Version: 13.5  © 1757-5373 Healthwise, [employer]. Care instructions adapted under license by [facility] [address]  If you have questions about a medical condition or this instruction, always ask your healthcare professional. [address] any warranty or liability for your use of this information. Hearing Loss: Care Instructions  Overview     Hearing loss is a sudden or slow decrease in how well you hear. It can range from mild to severe. Permanent hearing loss can occur with aging. It also can happen when you are exposed long-term to loud noise. Examples include listening to loud music, riding motorcycles, or being around other loud machines. Hearing loss can affect your work and home life. It can make you feel lonely or depressed. You may feel that you have lost your independence. But hearing aids and other devices can help you hear better and feel connected to others. Follow-up care is a key part of your treatment and safety. Be sure to make and go to all appointments, and call your doctor if you are having problems. It's also a good idea to know your test results and keep a list of the medicines you take. How can you care for yourself at home? Avoid loud noises whenever possible. This helps keep your hearing from getting worse. Always wear hearing protection around loud noises. Wear a hearing aid as directed. See a professional who can help you pick a hearing aid that fits you. Have hearing tests as your doctor suggests. They can show whether your hearing has changed. Your hearing aid may need to be adjusted. Use other devices as needed. These may include:  Telephone amplifiers and hearing aids that can connect to a television, stereo, radio, or microphone. Devices that use lights or vibrations. These alert you to the doorbell, a ringing telephone, or a baby monitor. Television closed-captioning. This shows the words at the bottom of the screen. Most new TVs can do this. TTY (text telephone). This lets you type messages back and forth on the telephone instead of talking or listening. These devices are also called TDD. When messages are typed on the keyboard, they are sent over the phone line to a receiving TTY. The message is shown on a monitor. Use text messaging, social media, and email if it is hard for you to communicate by telephone. Try to learn a listening technique called speechreading. It is not lipreading. You pay attention to people's gestures, expressions, posture, and tone of voice. These clues can help you understand what a person is saying. Face the person you are talking to, and have them face you. Make sure the lighting is good. You need to see the other person's face clearly. Think about counseling if you need help to adjust to your hearing loss. When should you call for help? Watch closely for changes in your health, and be sure to contact your doctor if:    You think your hearing is getting worse.     You have new symptoms, such as dizziness or nausea. Where can you learn more? Go to http://www.thomas.com/ and enter R798 to learn more about \"Hearing Loss: Care Instructions. \"  Current as of: May 4, 2022               Content Version: 13.5  © 7016-8401 Healthwise, [employer]orporated. Care instructions adapted under license by [facility] ([facility]). If you have questions about a medical condition or this instruction, always ask your healthcare professional. [place] [address] any warranty or liability for your use of this information. Advance Directives: Care Instructions  Overview  An advance directive is a legal way to state your wishes at the end of your life. It tells your family and your doctor what to do if you can't say what you want. There are two main types of advance directives. You can change them any time your wishes change. Living will. This form tells your family and your doctor your wishes about life support and other treatment. The form is also called a declaration. Medical power of .   This form lets you name a person to make treatment decisions for you when you can't speak for yourself. This person is called a health care agent (health care proxy, health care surrogate). The form is also called a durable power of  for health care. If you do not have an advance directive, decisions about your medical care may be made by a family member, or by a doctor or a  who doesn't know you. It may help to think of an advance directive as a gift to the people who care for you. If you have one, they won't have to make tough decisions by themselves. For more information, including forms for your state, see the [address] website (www.caringinfo.org/planning/advance-directives/). Follow-up care is a key part of your treatment and safety. Be sure to make and go to all appointments, and call your doctor if you are having problems. It's also a good idea to know your test results and keep a list of the medicines you take. What should you include in an advance directive? Many states have a unique advance directive form. (It may ask you to address specific issues.) Or you might use a universal form that's approved by many states. If your form doesn't tell you what to address, it may be hard to know what to include in your advance directive. Use the questions below to help you get started. Who do you want to make decisions about your medical care if you are not able to? What life-support measures do you want if you have a serious illness that gets worse over time or can't be cured? What are you most afraid of that might happen? (Maybe you're afraid of having pain, losing your independence, or being kept alive by machines.)  Where would you prefer to die? (Your home? A hospital? A nursing home?)  Do you want to donate your organs when you die? Do you want certain Christian practices performed before you die? When should you call for help? Be sure to contact your doctor if you have any questions. Where can you learn more?   Go to http://www.woods.com/ and enter R264 to learn more about \"Advance Directives: Care Instructions. \"  Current as of: June 16, 2022               Content Version: 13.5  © 9219-6187 [employer]. Care instructions adapted under license by [facility] ([facility]). If you have questions about a medical condition or this instruction, always ask your healthcare professional. [place] [address] any warranty or liability for your use of this information. A Healthy Heart: Care Instructions  Your Care Instructions     Coronary artery disease, also called heart disease, occurs when a substance called plaque builds up in the vessels that supply oxygen-rich blood to your heart muscle. This can narrow the blood vessels and reduce blood flow. A heart attack happens when blood flow is completely blocked. A high-fat diet, smoking, and other factors increase the risk of heart disease. Your doctor has found that you have a chance of having heart disease. You can do lots of things to keep your heart healthy. It may not be easy, but you can change your diet, exercise more, and quit smoking. These steps really work to lower your chance of heart disease. Follow-up care is a key part of your treatment and safety. Be sure to make and go to all appointments, and call your doctor if you are having problems. It's also a good idea to know your test results and keep a list of the medicines you take. How can you care for yourself at home? Diet    Use less salt when you cook and eat. This helps lower your blood pressure. Taste food before salting. Add only a little salt when you think you need it. With time, your taste buds will adjust to less salt.     Eat fewer snack items, fast foods, canned soups, and other high-salt, high-fat, processed foods.     Read food labels and try to avoid saturated and trans fats.  They increase your risk of heart disease by raising cholesterol levels.     Limit the amount of solid fat-butter, margarine, and shortening-you eat. Use olive, peanut, or canola oil when you cook. Bake, broil, and steam foods instead of frying them.     Eat a variety of fruit and vegetables every day. Dark green, deep orange, red, or yellow fruits and vegetables are especially good for you. Examples include spinach, carrots, peaches, and berries.     Foods high in fiber can reduce your cholesterol and provide important vitamins and minerals. High-fiber foods include whole-grain cereals and breads, oatmeal, beans, brown rice, citrus fruits, and apples.     Eat lean proteins. Heart-healthy proteins include seafood, lean meats and poultry, eggs, beans, peas, nuts, seeds, and soy products.     Limit drinks and foods with added sugar. These include candy, desserts, and soda pop. Lifestyle changes    If your doctor recommends it, get more exercise. Walking is a good choice. Bit by bit, increase the amount you walk every day. Try for at least 30 minutes on most days of the week. You also may want to swim, bike, or do other activities.     Do not smoke. If you need help quitting, talk to your doctor about stop-smoking programs and medicines. These can increase your chances of quitting for good. Quitting smoking may be the most important step you can take to protect your heart. It is never too late to quit.     Limit alcohol to 2 drinks a day for men and 1 drink a day for women. Too much alcohol can cause health problems.     Manage other health problems such as diabetes, high blood pressure, and high cholesterol. If you think you may have a problem with alcohol or drug use, talk to your doctor. Medicines    Take your medicines exactly as prescribed. Call your doctor if you think you are having a problem with your medicine.     If your doctor recommends aspirin, take the amount directed each day. Make sure you take aspirin and not another kind of pain reliever, such as acetaminophen (Tylenol).    When should you call for help? Call 911 if you have symptoms of a heart attack. These may include:    Chest pain or pressure, or a strange feeling in the chest.     Sweating.     Shortness of breath.     Pain, pressure, or a strange feeling in the back, neck, jaw, or upper belly or in one or both shoulders or arms.     Lightheadedness or sudden weakness.     A fast or irregular heartbeat. After you call 911, the  may tell you to chew 1 adult-strength or 2 to 4 low-dose aspirin. Wait for an ambulance. Do not try to drive yourself. Watch closely for changes in your health, and be sure to contact your doctor if you have any problems. Where can you learn more? Go to http://www.thomas.com/ and enter F075 to learn more about \"A Healthy Heart: Care Instructions. \"  Current as of: September 7, 2022               Content Version: 13.5  © 2589-3693 [employer]. Care instructions adapted under license by [facility] ([facility]). If you have questions about a medical condition or this instruction, always ask your healthcare professional. [place] [address] any warranty or liability for your use of this information. Personalized Preventive Plan for Lauri Miles - 3/9/2023  Medicare offers a range of preventive health benefits. Some of the tests and screenings are paid in full while other may be subject to a deductible, co-insurance, and/or copay. Some of these benefits include a comprehensive review of your medical history including lifestyle, illnesses that may run in your family, and various assessments and screenings as appropriate. After reviewing your medical record and screening and assessments performed today your provider may have ordered immunizations, labs, imaging, and/or referrals for you. A list of these orders (if applicable) as well as your Preventive Care list are included within your After Visit Summary for your review.     Other Preventive Recommendations:    A preventive eye exam performed by an eye specialist is recommended every 1-2 years to screen for glaucoma; cataracts, macular degeneration, and other eye disorders. A preventive dental visit is recommended every 6 months. Try to get at least 150 minutes of exercise per week or 10,000 steps per day on a pedometer . Order or download the FREE \"Exercise & Physical Activity: Your Everyday Guide\" from [employer]. Call 9-138.995.3187 or search [employer] online. You need 6709-0937 mg of calcium and 8749-8798 IU of vitamin D per day. It is possible to meet your calcium requirement with diet alone, but a vitamin D supplement is usually necessary to meet this goal.  When exposed to the sun, use a sunscreen that protects against both UVA and UVB radiation with an SPF of 30 or greater. Reapply every 2 to 3 hours or after sweating, drying off with a towel, or swimming. Always wear a seat belt when traveling in a car. Always wear a helmet when riding a bicycle or motorcycle.

## 2023-03-09 NOTE — PROGRESS NOTES
Medicare Annual Wellness Visit    Tushar Mejias is here for Medicare AWV    Assessment & Plan   Medicare annual wellness visit, subsequent      Recommendations for Preventive Services Due: see orders and patient instructions/AVS.  Recommended screening schedule for the next 5-10 years is provided to the patient in written form: see Patient Instructions/AVS.     Return for Medicare Annual Wellness Visit in 1 year. Subjective       Patient's complete Health Risk Assessment and screening values have been reviewed and are found in Flowsheets. The following problems were reviewed today and where indicated follow up appointments were made and/or referrals ordered. Positive Risk Factor Screenings with Interventions:    Fall Risk:  Do you feel unsteady or are you worried about falling? : no  2 or more falls in past year?: (!) yes  Fall with injury in past year?: (!) yes     Interventions:    Patient declines any further evaluation or treatment- advised to use cane. She is going to therapy pool twice a week. General HRA Questions:  Select all that apply: (!) New or Increased Fatigue    Fatigue Interventions:  Patient comments: due to cough- getting better now. Dentist Screen:  Have you seen the dentist within the past year?: (!) No    Intervention:  Advised to schedule with their dentist      Safety:  Do you have either shower bars, grab bars, non-slip mats or non-slip surfaces in your shower or bathtub?: Yes  Interventions:  Patient comments: has placed in home. Advanced Directives:  Do you have a Living Will?: (!) No    Intervention:   passed- and has been procrastinating. She will work on it again. Objective   There were no vitals filed for this visit. There is no height or weight on file to calculate BMI.               Allergies   Allergen Reactions    Penicillins Hives    Sulfa Antibiotics Itching    Levofloxacin      \" Cannot remember\"    Cymbalta [Duloxetine Hcl] Anxiety    Lyrica [Pregabalin] Anxiety    Tramadol Nausea Only     Prior to Visit Medications    Medication Sig Taking? Authorizing Provider   cetirizine (ZYRTEC) 5 MG tablet Take 1 tablet by mouth daily Yes Jodi Hunt MD   albuterol sulfate HFA (VENTOLIN HFA) 108 (90 Base) MCG/ACT inhaler Inhale 2 puffs into the lungs 4 times daily as needed for Wheezing Yes Jodi Hunt MD   amLODIPine (NORVASC) 5 MG tablet Take 2 tablets (1 morning and 1 evening) daily. Yes Jodi Hunt MD   rosuvastatin (CRESTOR) 5 MG tablet Take 1 tablet by mouth nightly Yes Giancarlo Green MD   busPIRone (BUSPAR) 5 MG tablet TAKE ONE TO TWO TABLETS BY MOUTH EVERY DAY AS NEEDED Yes Branden Ortiz MD   metoprolol succinate (TOPROL XL) 50 MG extended release tablet TAKE 1 TABLET EVERY DAY Yes Branden Ortiz MD   Multiple Vitamins-Minerals (THERAPEUTIC MULTIVITAMIN-MINERALS) tablet Take 1 tablet by mouth nightly Yes Historical Provider, MD   Omega-3 Fatty Acids (FISH OIL) 1000 MG CAPS Take 1,000 mg by mouth nightly Not sure of dosage Yes Historical Provider, MD   NONFORMULARY daily Tumeric-puts 1/4 tsp in shake Yes Historical Provider, MD   Glucosamine-Chondroit-Vit C-Mn (GLUCOSAMINE 1500 COMPLEX) CAPS Take 1,500 mg by mouth daily.   Patient taking differently: Take 1,500 mg by mouth nightly Yes Aline Calderón MD       [facility] (Including outside providers/suppliers regularly involved in providing care):   Patient Care Team:  Jodi Hunt MD as PCP - General (Internal Medicine)  Jodi Hunt MD as PCP - Empaneled Provider     Reviewed and updated this visit:  Allergies  Meds  Problems  Med Hx  Surg Hx  Soc Hx  Fam Hx             Jodi Hunt MD

## 2023-08-23 ENCOUNTER — TELEPHONE (OUTPATIENT)
Dept: INTERNAL MEDICINE CLINIC | Age: 83
End: 2023-08-23

## 2023-08-23 DIAGNOSIS — G89.29 CHRONIC MIDLINE LOW BACK PAIN WITH SCIATICA, SCIATICA LATERALITY UNSPECIFIED: Primary | ICD-10-CM

## 2023-08-23 DIAGNOSIS — M54.40 CHRONIC MIDLINE LOW BACK PAIN WITH SCIATICA, SCIATICA LATERALITY UNSPECIFIED: Primary | ICD-10-CM

## 2023-08-23 NOTE — TELEPHONE ENCOUNTER
Pt called, needs referral to have battery changed for pain stimulator at [place] with Dr. Rick Bone  Fax: [phone]    Thank you

## 2023-09-04 ASSESSMENT — ENCOUNTER SYMPTOMS
BACK PAIN: 0
EYE REDNESS: 0
EYE DISCHARGE: 0

## 2023-09-04 NOTE — PROGRESS NOTES
transcription may have occurred. An electronic signature was used to authenticate this note.     --Madie Bravo MD

## 2023-09-05 ENCOUNTER — OFFICE VISIT (OUTPATIENT)
Dept: INTERNAL MEDICINE CLINIC | Age: 83
End: 2023-09-05
Payer: MEDICARE

## 2023-09-05 VITALS
DIASTOLIC BLOOD PRESSURE: 68 MMHG | SYSTOLIC BLOOD PRESSURE: 172 MMHG | HEIGHT: 64 IN | HEART RATE: 68 BPM | OXYGEN SATURATION: 96 % | WEIGHT: 168.2 LBS | BODY MASS INDEX: 28.71 KG/M2

## 2023-09-05 DIAGNOSIS — I10 ESSENTIAL HYPERTENSION, BENIGN: Primary | ICD-10-CM

## 2023-09-05 DIAGNOSIS — E78.2 MIXED HYPERLIPIDEMIA: ICD-10-CM

## 2023-09-05 DIAGNOSIS — I49.3 PVC (PREMATURE VENTRICULAR CONTRACTION): ICD-10-CM

## 2023-09-05 PROCEDURE — 3078F DIAST BP <80 MM HG: CPT | Performed by: STUDENT IN AN ORGANIZED HEALTH CARE EDUCATION/TRAINING PROGRAM

## 2023-09-05 PROCEDURE — G8417 CALC BMI ABV UP PARAM F/U: HCPCS | Performed by: STUDENT IN AN ORGANIZED HEALTH CARE EDUCATION/TRAINING PROGRAM

## 2023-09-05 PROCEDURE — 99214 OFFICE O/P EST MOD 30 MIN: CPT | Performed by: STUDENT IN AN ORGANIZED HEALTH CARE EDUCATION/TRAINING PROGRAM

## 2023-09-05 PROCEDURE — 1036F TOBACCO NON-USER: CPT | Performed by: STUDENT IN AN ORGANIZED HEALTH CARE EDUCATION/TRAINING PROGRAM

## 2023-09-05 PROCEDURE — G8399 PT W/DXA RESULTS DOCUMENT: HCPCS | Performed by: STUDENT IN AN ORGANIZED HEALTH CARE EDUCATION/TRAINING PROGRAM

## 2023-09-05 PROCEDURE — G8427 DOCREV CUR MEDS BY ELIG CLIN: HCPCS | Performed by: STUDENT IN AN ORGANIZED HEALTH CARE EDUCATION/TRAINING PROGRAM

## 2023-09-05 PROCEDURE — 3077F SYST BP >= 140 MM HG: CPT | Performed by: STUDENT IN AN ORGANIZED HEALTH CARE EDUCATION/TRAINING PROGRAM

## 2023-09-05 PROCEDURE — 1123F ACP DISCUSS/DSCN MKR DOCD: CPT | Performed by: STUDENT IN AN ORGANIZED HEALTH CARE EDUCATION/TRAINING PROGRAM

## 2023-09-05 PROCEDURE — 1090F PRES/ABSN URINE INCON ASSESS: CPT | Performed by: STUDENT IN AN ORGANIZED HEALTH CARE EDUCATION/TRAINING PROGRAM

## 2023-09-05 RX ORDER — LOSARTAN POTASSIUM AND HYDROCHLOROTHIAZIDE 12.5; 5 MG/1; MG/1
1 TABLET ORAL DAILY
Qty: 90 TABLET | Refills: 1 | Status: SHIPPED | OUTPATIENT
Start: 2023-09-05 | End: 2023-09-05

## 2023-09-05 RX ORDER — AMLODIPINE BESYLATE 5 MG/1
TABLET ORAL
Qty: 180 TABLET | Refills: 0 | Status: SHIPPED | OUTPATIENT
Start: 2023-09-05

## 2023-09-05 RX ORDER — METOPROLOL SUCCINATE 50 MG/1
25 TABLET, EXTENDED RELEASE ORAL DAILY
Qty: 90 TABLET | Refills: 0 | Status: SHIPPED | OUTPATIENT
Start: 2023-09-05

## 2023-09-05 RX ORDER — LOSARTAN POTASSIUM AND HYDROCHLOROTHIAZIDE 12.5; 5 MG/1; MG/1
0.5 TABLET ORAL DAILY
Qty: 90 TABLET | Refills: 1 | Status: SHIPPED | OUTPATIENT
Start: 2023-09-05

## 2023-09-13 DIAGNOSIS — I10 ESSENTIAL HYPERTENSION, BENIGN: ICD-10-CM

## 2023-09-13 RX ORDER — LOSARTAN POTASSIUM AND HYDROCHLOROTHIAZIDE 12.5; 5 MG/1; MG/1
0.5 TABLET ORAL DAILY
Qty: 90 TABLET | Refills: 1 | OUTPATIENT
Start: 2023-09-13

## 2023-09-13 RX ORDER — METOPROLOL SUCCINATE 50 MG/1
25 TABLET, EXTENDED RELEASE ORAL DAILY
Qty: 90 TABLET | Refills: 0 | OUTPATIENT
Start: 2023-09-13

## 2023-10-05 ENCOUNTER — OFFICE VISIT (OUTPATIENT)
Dept: INTERNAL MEDICINE CLINIC | Age: 83
End: 2023-10-05
Payer: MEDICARE

## 2023-10-05 VITALS
HEIGHT: 64 IN | DIASTOLIC BLOOD PRESSURE: 80 MMHG | BODY MASS INDEX: 29.09 KG/M2 | WEIGHT: 170.4 LBS | SYSTOLIC BLOOD PRESSURE: 190 MMHG

## 2023-10-05 DIAGNOSIS — Z01.818 PREOP EXAMINATION: Primary | ICD-10-CM

## 2023-10-05 DIAGNOSIS — I10 PRIMARY HYPERTENSION: ICD-10-CM

## 2023-10-05 LAB
ANION GAP SERPL CALCULATED.3IONS-SCNC: 8 MMOL/L (ref 3–16)
BASOPHILS # BLD: 0.1 K/UL (ref 0–0.2)
BASOPHILS NFR BLD: 1.4 %
BUN SERPL-MCNC: 22 MG/DL (ref 7–20)
CALCIUM SERPL-MCNC: 8.9 MG/DL (ref 8.3–10.6)
CHLORIDE SERPL-SCNC: 104 MMOL/L (ref 99–110)
CO2 SERPL-SCNC: 30 MMOL/L (ref 21–32)
CREAT SERPL-MCNC: 0.9 MG/DL (ref 0.6–1.2)
DEPRECATED RDW RBC AUTO: 14.9 % (ref 12.4–15.4)
EOSINOPHIL # BLD: 0.3 K/UL (ref 0–0.6)
EOSINOPHIL NFR BLD: 3.6 %
GFR SERPLBLD CREATININE-BSD FMLA CKD-EPI: >60 ML/MIN/{1.73_M2}
GLUCOSE SERPL-MCNC: 114 MG/DL (ref 70–99)
HCT VFR BLD AUTO: 39.1 % (ref 36–48)
HGB BLD-MCNC: 13.2 G/DL (ref 12–16)
LYMPHOCYTES # BLD: 2.1 K/UL (ref 1–5.1)
LYMPHOCYTES NFR BLD: 29.4 %
MCH RBC QN AUTO: 30.4 PG (ref 26–34)
MCHC RBC AUTO-ENTMCNC: 33.9 G/DL (ref 31–36)
MCV RBC AUTO: 89.9 FL (ref 80–100)
MONOCYTES # BLD: 0.5 K/UL (ref 0–1.3)
MONOCYTES NFR BLD: 7.3 %
NEUTROPHILS # BLD: 4.2 K/UL (ref 1.7–7.7)
NEUTROPHILS NFR BLD: 58.3 %
PLATELET # BLD AUTO: 280 K/UL (ref 135–450)
PMV BLD AUTO: 8.3 FL (ref 5–10.5)
POTASSIUM SERPL-SCNC: 4.4 MMOL/L (ref 3.5–5.1)
RBC # BLD AUTO: 4.35 M/UL (ref 4–5.2)
SODIUM SERPL-SCNC: 142 MMOL/L (ref 136–145)
WBC # BLD AUTO: 7.1 K/UL (ref 4–11)

## 2023-10-05 PROCEDURE — 1036F TOBACCO NON-USER: CPT | Performed by: STUDENT IN AN ORGANIZED HEALTH CARE EDUCATION/TRAINING PROGRAM

## 2023-10-05 PROCEDURE — 3079F DIAST BP 80-89 MM HG: CPT | Performed by: STUDENT IN AN ORGANIZED HEALTH CARE EDUCATION/TRAINING PROGRAM

## 2023-10-05 PROCEDURE — 36415 COLL VENOUS BLD VENIPUNCTURE: CPT | Performed by: STUDENT IN AN ORGANIZED HEALTH CARE EDUCATION/TRAINING PROGRAM

## 2023-10-05 PROCEDURE — 3077F SYST BP >= 140 MM HG: CPT | Performed by: STUDENT IN AN ORGANIZED HEALTH CARE EDUCATION/TRAINING PROGRAM

## 2023-10-05 PROCEDURE — G8399 PT W/DXA RESULTS DOCUMENT: HCPCS | Performed by: STUDENT IN AN ORGANIZED HEALTH CARE EDUCATION/TRAINING PROGRAM

## 2023-10-05 PROCEDURE — G8417 CALC BMI ABV UP PARAM F/U: HCPCS | Performed by: STUDENT IN AN ORGANIZED HEALTH CARE EDUCATION/TRAINING PROGRAM

## 2023-10-05 PROCEDURE — 1123F ACP DISCUSS/DSCN MKR DOCD: CPT | Performed by: STUDENT IN AN ORGANIZED HEALTH CARE EDUCATION/TRAINING PROGRAM

## 2023-10-05 PROCEDURE — 1090F PRES/ABSN URINE INCON ASSESS: CPT | Performed by: STUDENT IN AN ORGANIZED HEALTH CARE EDUCATION/TRAINING PROGRAM

## 2023-10-05 PROCEDURE — 99214 OFFICE O/P EST MOD 30 MIN: CPT | Performed by: STUDENT IN AN ORGANIZED HEALTH CARE EDUCATION/TRAINING PROGRAM

## 2023-10-05 PROCEDURE — G8427 DOCREV CUR MEDS BY ELIG CLIN: HCPCS | Performed by: STUDENT IN AN ORGANIZED HEALTH CARE EDUCATION/TRAINING PROGRAM

## 2023-10-05 PROCEDURE — G8484 FLU IMMUNIZE NO ADMIN: HCPCS | Performed by: STUDENT IN AN ORGANIZED HEALTH CARE EDUCATION/TRAINING PROGRAM

## 2023-10-05 RX ORDER — CALCIUM CARBONATE/VITAMIN D2 250 MG-125
TABLET ORAL
COMMUNITY
Start: 2017-02-24

## 2023-10-05 ASSESSMENT — ENCOUNTER SYMPTOMS
EYE DISCHARGE: 0
BACK PAIN: 1
ABDOMINAL PAIN: 0
GASTROINTESTINAL NEGATIVE: 1
RESPIRATORY NEGATIVE: 1
EYES NEGATIVE: 1
SHORTNESS OF BREATH: 0
ALLERGIC/IMMUNOLOGIC NEGATIVE: 1

## 2023-10-11 ENCOUNTER — NURSE ONLY (OUTPATIENT)
Dept: INTERNAL MEDICINE CLINIC | Age: 83
End: 2023-10-11
Payer: MEDICARE

## 2023-10-11 VITALS — HEART RATE: 55 BPM | SYSTOLIC BLOOD PRESSURE: 142 MMHG | DIASTOLIC BLOOD PRESSURE: 86 MMHG | OXYGEN SATURATION: 100 %

## 2023-10-11 DIAGNOSIS — Z01.818 PREOP EXAMINATION: ICD-10-CM

## 2023-10-11 PROCEDURE — 93000 ELECTROCARDIOGRAM COMPLETE: CPT | Performed by: STUDENT IN AN ORGANIZED HEALTH CARE EDUCATION/TRAINING PROGRAM

## 2023-10-11 PROCEDURE — 99999 PR OFFICE/OUTPT VISIT,PROCEDURE ONLY: CPT | Performed by: STUDENT IN AN ORGANIZED HEALTH CARE EDUCATION/TRAINING PROGRAM

## 2023-10-23 ENCOUNTER — HOSPITAL ENCOUNTER (OUTPATIENT)
Age: 83
Discharge: HOME | End: 2023-10-23
Payer: MEDICARE

## 2023-10-23 PROCEDURE — 87641 MR-STAPH DNA AMP PROBE: CPT

## 2023-10-24 LAB — MRSA DNA SPEC QL NAA+PROBE: NORMAL

## 2024-02-21 ENCOUNTER — HOSPITAL ENCOUNTER (OUTPATIENT)
Dept: WOMENS IMAGING | Age: 84
Discharge: HOME | End: 2024-02-21
Payer: MEDICARE

## 2024-02-21 DIAGNOSIS — Z12.31 ENCOUNTER FOR SCREENING MAMMOGRAM FOR BREAST CANCER: ICD-10-CM

## 2024-02-21 PROCEDURE — 77063 BREAST TOMOSYNTHESIS BI: CPT

## 2024-03-14 DIAGNOSIS — I10 ESSENTIAL HYPERTENSION, BENIGN: ICD-10-CM

## 2024-03-15 RX ORDER — AMLODIPINE BESYLATE 5 MG/1
TABLET ORAL
Qty: 180 TABLET | Refills: 0 | Status: SHIPPED | OUTPATIENT
Start: 2024-03-15

## 2024-03-23 DIAGNOSIS — I10 ESSENTIAL HYPERTENSION, BENIGN: ICD-10-CM

## 2024-03-25 RX ORDER — METOPROLOL SUCCINATE 50 MG/1
25 TABLET, EXTENDED RELEASE ORAL DAILY
Qty: 45 TABLET | Refills: 3 | Status: SHIPPED | OUTPATIENT
Start: 2024-03-25

## 2024-05-27 DIAGNOSIS — I10 ESSENTIAL HYPERTENSION, BENIGN: ICD-10-CM

## 2024-05-28 RX ORDER — AMLODIPINE BESYLATE 5 MG/1
TABLET ORAL
Qty: 180 TABLET | Refills: 1 | Status: SHIPPED | OUTPATIENT
Start: 2024-05-28

## 2024-05-28 NOTE — TELEPHONE ENCOUNTER
Last office visit 10/5/2023       Next office visit scheduled Visit date not found    Requested Prescriptions     Pending Prescriptions Disp Refills    amLODIPine (NORVASC) 5 MG tablet [Pharmacy Med Name: AMLODIPINE BESYLATE 5 MG Tablet] 180 tablet 3     Sig: TAKE 2 TABLETS EVERY DAY (1 TABLET IN THE MORNING AND EVENING)

## 2024-06-10 SDOH — ECONOMIC STABILITY: FOOD INSECURITY: WITHIN THE PAST 12 MONTHS, THE FOOD YOU BOUGHT JUST DIDN'T LAST AND YOU DIDN'T HAVE MONEY TO GET MORE.: NEVER TRUE

## 2024-06-10 SDOH — ECONOMIC STABILITY: TRANSPORTATION INSECURITY
IN THE PAST 12 MONTHS, HAS LACK OF TRANSPORTATION KEPT YOU FROM MEETINGS, WORK, OR FROM GETTING THINGS NEEDED FOR DAILY LIVING?: NO

## 2024-06-10 SDOH — ECONOMIC STABILITY: FOOD INSECURITY: WITHIN THE PAST 12 MONTHS, YOU WORRIED THAT YOUR FOOD WOULD RUN OUT BEFORE YOU GOT MONEY TO BUY MORE.: NEVER TRUE

## 2024-06-10 SDOH — ECONOMIC STABILITY: INCOME INSECURITY: HOW HARD IS IT FOR YOU TO PAY FOR THE VERY BASICS LIKE FOOD, HOUSING, MEDICAL CARE, AND HEATING?: NOT HARD AT ALL

## 2024-06-10 SDOH — HEALTH STABILITY: PHYSICAL HEALTH: ON AVERAGE, HOW MANY DAYS PER WEEK DO YOU ENGAGE IN MODERATE TO STRENUOUS EXERCISE (LIKE A BRISK WALK)?: 5 DAYS

## 2024-06-10 SDOH — HEALTH STABILITY: PHYSICAL HEALTH: ON AVERAGE, HOW MANY MINUTES DO YOU ENGAGE IN EXERCISE AT THIS LEVEL?: 130 MIN

## 2024-06-10 ASSESSMENT — PATIENT HEALTH QUESTIONNAIRE - PHQ9
2. FEELING DOWN, DEPRESSED OR HOPELESS: NOT AT ALL
SUM OF ALL RESPONSES TO PHQ9 QUESTIONS 1 & 2: 0
SUM OF ALL RESPONSES TO PHQ QUESTIONS 1-9: 0
1. LITTLE INTEREST OR PLEASURE IN DOING THINGS: NOT AT ALL
SUM OF ALL RESPONSES TO PHQ QUESTIONS 1-9: 0

## 2024-06-10 ASSESSMENT — LIFESTYLE VARIABLES
HOW OFTEN DO YOU HAVE A DRINK CONTAINING ALCOHOL: MONTHLY OR LESS
HOW OFTEN DO YOU HAVE A DRINK CONTAINING ALCOHOL: 2
HOW MANY STANDARD DRINKS CONTAINING ALCOHOL DO YOU HAVE ON A TYPICAL DAY: PATIENT DOES NOT DRINK
HOW MANY STANDARD DRINKS CONTAINING ALCOHOL DO YOU HAVE ON A TYPICAL DAY: 0
HOW OFTEN DO YOU HAVE SIX OR MORE DRINKS ON ONE OCCASION: 1

## 2024-06-12 ENCOUNTER — OFFICE VISIT (OUTPATIENT)
Dept: INTERNAL MEDICINE CLINIC | Age: 84
End: 2024-06-12
Payer: MEDICARE

## 2024-06-12 VITALS
OXYGEN SATURATION: 98 % | SYSTOLIC BLOOD PRESSURE: 132 MMHG | BODY MASS INDEX: 28.32 KG/M2 | WEIGHT: 165 LBS | DIASTOLIC BLOOD PRESSURE: 82 MMHG | TEMPERATURE: 98.6 F | HEART RATE: 54 BPM

## 2024-06-12 DIAGNOSIS — Z00.00 MEDICARE ANNUAL WELLNESS VISIT, SUBSEQUENT: Primary | ICD-10-CM

## 2024-06-12 DIAGNOSIS — M85.80 OSTEOPENIA, UNSPECIFIED LOCATION: ICD-10-CM

## 2024-06-12 DIAGNOSIS — Z78.0 POST-MENOPAUSAL: ICD-10-CM

## 2024-06-12 DIAGNOSIS — R20.0 NUMBNESS: ICD-10-CM

## 2024-06-12 DIAGNOSIS — R73.03 PREDIABETES: ICD-10-CM

## 2024-06-12 DIAGNOSIS — E78.2 MIXED HYPERLIPIDEMIA: ICD-10-CM

## 2024-06-12 DIAGNOSIS — R00.1 BRADYCARDIA: ICD-10-CM

## 2024-06-12 DIAGNOSIS — F41.1 GAD (GENERALIZED ANXIETY DISORDER): ICD-10-CM

## 2024-06-12 DIAGNOSIS — I48.0 PAROXYSMAL ATRIAL FIBRILLATION (HCC): ICD-10-CM

## 2024-06-12 DIAGNOSIS — I10 ESSENTIAL HYPERTENSION, BENIGN: ICD-10-CM

## 2024-06-12 LAB
25(OH)D3 SERPL-MCNC: 48.3 NG/ML
CHOLEST SERPL-MCNC: 221 MG/DL (ref 0–199)
FOLATE SERPL-MCNC: >20 NG/ML (ref 4.78–24.2)
HDLC SERPL-MCNC: 58 MG/DL (ref 40–60)
LDL CHOLESTEROL: 142 MG/DL
TRIGL SERPL-MCNC: 105 MG/DL (ref 0–150)
TSH SERPL DL<=0.005 MIU/L-ACNC: 2.37 UIU/ML (ref 0.27–4.2)
VIT B12 SERPL-MCNC: 693 PG/ML (ref 211–911)
VLDLC SERPL CALC-MCNC: 21 MG/DL

## 2024-06-12 PROCEDURE — 1036F TOBACCO NON-USER: CPT | Performed by: STUDENT IN AN ORGANIZED HEALTH CARE EDUCATION/TRAINING PROGRAM

## 2024-06-12 PROCEDURE — 99214 OFFICE O/P EST MOD 30 MIN: CPT | Performed by: STUDENT IN AN ORGANIZED HEALTH CARE EDUCATION/TRAINING PROGRAM

## 2024-06-12 PROCEDURE — 1123F ACP DISCUSS/DSCN MKR DOCD: CPT | Performed by: STUDENT IN AN ORGANIZED HEALTH CARE EDUCATION/TRAINING PROGRAM

## 2024-06-12 PROCEDURE — G0439 PPPS, SUBSEQ VISIT: HCPCS | Performed by: STUDENT IN AN ORGANIZED HEALTH CARE EDUCATION/TRAINING PROGRAM

## 2024-06-12 PROCEDURE — G8417 CALC BMI ABV UP PARAM F/U: HCPCS | Performed by: STUDENT IN AN ORGANIZED HEALTH CARE EDUCATION/TRAINING PROGRAM

## 2024-06-12 PROCEDURE — 3075F SYST BP GE 130 - 139MM HG: CPT | Performed by: STUDENT IN AN ORGANIZED HEALTH CARE EDUCATION/TRAINING PROGRAM

## 2024-06-12 PROCEDURE — 3079F DIAST BP 80-89 MM HG: CPT | Performed by: STUDENT IN AN ORGANIZED HEALTH CARE EDUCATION/TRAINING PROGRAM

## 2024-06-12 PROCEDURE — G8427 DOCREV CUR MEDS BY ELIG CLIN: HCPCS | Performed by: STUDENT IN AN ORGANIZED HEALTH CARE EDUCATION/TRAINING PROGRAM

## 2024-06-12 PROCEDURE — 1090F PRES/ABSN URINE INCON ASSESS: CPT | Performed by: STUDENT IN AN ORGANIZED HEALTH CARE EDUCATION/TRAINING PROGRAM

## 2024-06-12 PROCEDURE — G8399 PT W/DXA RESULTS DOCUMENT: HCPCS | Performed by: STUDENT IN AN ORGANIZED HEALTH CARE EDUCATION/TRAINING PROGRAM

## 2024-06-12 RX ORDER — METOPROLOL SUCCINATE 25 MG/1
25 TABLET, EXTENDED RELEASE ORAL DAILY
Qty: 90 TABLET | Refills: 0 | Status: SHIPPED | OUTPATIENT
Start: 2024-06-12 | End: 2024-06-12

## 2024-06-12 RX ORDER — METOPROLOL SUCCINATE 25 MG/1
12.5 TABLET, EXTENDED RELEASE ORAL DAILY
Qty: 90 TABLET | Refills: 0 | Status: SHIPPED | OUTPATIENT
Start: 2024-06-12

## 2024-06-12 SDOH — ECONOMIC STABILITY: FOOD INSECURITY: WITHIN THE PAST 12 MONTHS, YOU WORRIED THAT YOUR FOOD WOULD RUN OUT BEFORE YOU GOT MONEY TO BUY MORE.: NEVER TRUE

## 2024-06-12 SDOH — ECONOMIC STABILITY: FOOD INSECURITY: WITHIN THE PAST 12 MONTHS, THE FOOD YOU BOUGHT JUST DIDN'T LAST AND YOU DIDN'T HAVE MONEY TO GET MORE.: NEVER TRUE

## 2024-06-12 SDOH — ECONOMIC STABILITY: INCOME INSECURITY: HOW HARD IS IT FOR YOU TO PAY FOR THE VERY BASICS LIKE FOOD, HOUSING, MEDICAL CARE, AND HEATING?: NOT HARD AT ALL

## 2024-06-12 NOTE — PATIENT INSTRUCTIONS
for your state, see the [employer] website (www.caringinfo.org/planning/advance-directives/).  Follow-up care is a key part of your treatment and safety. Be sure to make and go to all appointments, and call your doctor if you are having problems. It's also a good idea to know your test results and keep a list of the medicines you take.  What should you include in an advance directive?  Many states have a unique advance directive form. (It may ask you to address specific issues.) Or you might use a universal form that's approved by many states.  If your form doesn't tell you what to address, it may be hard to know what to include in your advance directive. Use the questions below to help you get started.  Who do you want to make decisions about your medical care if you are not able to?  What life-support measures do you want if you have a serious illness that gets worse over time or can't be cured?  What are you most afraid of that might happen? (Maybe you're afraid of having pain, losing your independence, or being kept alive by machines.)  Where would you prefer to die? (Your home? A hospital? A nursing home?)  Do you want to donate your organs when you die?  Do you want certain Jainism practices performed before you die?  When should you call for help?  Be sure to contact your doctor if you have any questions.  Where can you learn more?  Go to https://[email or web address].net/patientEd and enter R264 to learn more about \"Advance Directives: Care Instructions.\"  Current as of: November 16, 2023  Content Version: 14.1  © 0497-5876 [employer].   Care instructions adapted under license by [employer]. If you have questions about a medical condition or this instruction, always ask your healthcare professional. [employer] disclaims any warranty or liability for your use of this information.           A Healthy Heart: Care Instructions  Overview     Coronary artery disease, also called heart

## 2024-06-12 NOTE — PROGRESS NOTES
Medicare Annual Wellness Visit    Kelle Sarabia is here for Annual Exam and Follow-up  chronic health issues include obesity, mixed urinary incontinence, osteoarthritis, lumbar laminectomy, hip replacement, hypertension, prediabetes, hyperlipidemia.intermittent a fib flutter, CKD 3.     Left radial fracture 2021.      History GE reflux no longer having symptoms, off her PPI.      .  1 son  accident hiking.  Living son with recent MI.  h/o depressed- Not interested in medication.  Feels like grief is normal     ----Lumbar laminectomy with lower extremity paresthesias and spinal stimulator.  B/l hip replaced- - . no longer on a cane or a walker.  Does mowh her yard.     ---- Paroxysmal atrial fibrillation (HCC)-patient is seeing a cardiologist, she has a history of  Atrial runs and SVTs for few seconds seen in Holter monitor.  Denies of being on blood thinner.  Currently rate controlled being on metoprolol     Dexa -  osteopenia, does exercise including [employer] machine.  Take vitamin D and calcium.       Assessment & Plan   Medicare annual wellness visit, subsequent continue to  Memory test, labs ordered.  -     Vitamin B12 & Folate  -     metoprolol succinate (TOPROL XL) 25 MG extended release tablet; Take 1 tablet by mouth daily, Disp-90 tablet, R-0Normal  -     Vitamin D 25 Hydroxy  Essential hypertension, benign blood pressure stable, continue the Hyzaar 50/12.5, amlodipine 5.  Will decrease the dose of metoprolol to 12.5 mg daily.  -     metoprolol succinate (TOPROL XL) 12.5 MG extended release tablet; Take 1 tablet by mouth daily, Disp-90 tablet, R-0Normal  Mixed hyperlipidemia has not been taking Crestor 5 mg, labs ordered.  -     Lipid, Fasting; Future  Paroxysmal atrial fibrillation (HCC) managed by cardiologist, defer management.  ADY (generalized anxiety disorder) stable, takes BuSpar only as needed, continue.  Bradycardia will lower the dose of metoprolol to 12.5 mg

## 2024-06-13 DIAGNOSIS — E78.2 MIXED HYPERLIPIDEMIA: Primary | ICD-10-CM

## 2024-06-13 LAB — EST. AVERAGE GLUCOSE BLD GHB EST-MCNC: 128.4 MG/DL

## 2024-06-13 RX ORDER — ROSUVASTATIN CALCIUM 5 MG/1
5 TABLET, COATED ORAL NIGHTLY
Qty: 90 TABLET | Refills: 1 | Status: SHIPPED | OUTPATIENT
Start: 2024-06-13

## 2024-07-17 ENCOUNTER — HOSPITAL ENCOUNTER (OUTPATIENT)
Dept: WOMENS IMAGING | Age: 84
Discharge: HOME | End: 2024-07-17
Payer: MEDICARE

## 2024-07-17 DIAGNOSIS — Z78.0 POST-MENOPAUSAL: ICD-10-CM

## 2024-07-17 DIAGNOSIS — M85.80 OSTEOPENIA, UNSPECIFIED LOCATION: ICD-10-CM

## 2024-07-17 PROCEDURE — 77080 DXA BONE DENSITY AXIAL: CPT

## 2024-07-21 DIAGNOSIS — I10 ESSENTIAL HYPERTENSION, BENIGN: ICD-10-CM

## 2024-07-22 RX ORDER — LOSARTAN POTASSIUM AND HYDROCHLOROTHIAZIDE 12.5; 5 MG/1; MG/1
0.5 TABLET ORAL DAILY
Qty: 45 TABLET | Refills: 1 | Status: SHIPPED | OUTPATIENT
Start: 2024-07-22

## 2024-10-21 DIAGNOSIS — I10 ESSENTIAL HYPERTENSION, BENIGN: ICD-10-CM

## 2024-10-21 RX ORDER — AMLODIPINE BESYLATE 5 MG/1
TABLET ORAL
Qty: 180 TABLET | Refills: 1 | Status: SHIPPED | OUTPATIENT
Start: 2024-10-21

## 2024-10-31 DIAGNOSIS — Z00.00 MEDICARE ANNUAL WELLNESS VISIT, SUBSEQUENT: ICD-10-CM

## 2024-10-31 DIAGNOSIS — I10 ESSENTIAL HYPERTENSION, BENIGN: ICD-10-CM

## 2024-10-31 RX ORDER — METOPROLOL SUCCINATE 25 MG/1
25 TABLET, EXTENDED RELEASE ORAL DAILY
Qty: 90 TABLET | Refills: 0 | Status: SHIPPED | OUTPATIENT
Start: 2024-10-31

## 2024-12-15 DIAGNOSIS — I10 ESSENTIAL HYPERTENSION, BENIGN: ICD-10-CM

## 2024-12-16 RX ORDER — LOSARTAN POTASSIUM AND HYDROCHLOROTHIAZIDE 12.5; 5 MG/1; MG/1
0.5 TABLET ORAL DAILY
Qty: 45 TABLET | Refills: 1 | Status: SHIPPED | OUTPATIENT
Start: 2024-12-16

## 2025-01-12 DIAGNOSIS — I10 ESSENTIAL HYPERTENSION, BENIGN: ICD-10-CM

## 2025-01-12 DIAGNOSIS — Z00.00 MEDICARE ANNUAL WELLNESS VISIT, SUBSEQUENT: ICD-10-CM

## 2025-01-13 RX ORDER — METOPROLOL SUCCINATE 25 MG/1
25 TABLET, EXTENDED RELEASE ORAL DAILY
Qty: 90 TABLET | Refills: 0 | Status: SHIPPED | OUTPATIENT
Start: 2025-01-13

## 2025-03-17 DIAGNOSIS — I10 ESSENTIAL HYPERTENSION, BENIGN: ICD-10-CM

## 2025-03-17 RX ORDER — AMLODIPINE BESYLATE 5 MG/1
TABLET ORAL
Qty: 180 TABLET | Refills: 3 | Status: SHIPPED | OUTPATIENT
Start: 2025-03-17

## 2025-03-28 DIAGNOSIS — Z00.00 MEDICARE ANNUAL WELLNESS VISIT, SUBSEQUENT: ICD-10-CM

## 2025-03-28 DIAGNOSIS — I10 ESSENTIAL HYPERTENSION, BENIGN: ICD-10-CM

## 2025-03-28 RX ORDER — METOPROLOL SUCCINATE 25 MG/1
25 TABLET, EXTENDED RELEASE ORAL DAILY
Qty: 90 TABLET | Refills: 1 | Status: SHIPPED | OUTPATIENT
Start: 2025-03-28

## 2025-03-28 NOTE — TELEPHONE ENCOUNTER
Medication:   Requested Prescriptions     Pending Prescriptions Disp Refills    metoprolol succinate (TOPROL XL) 25 MG extended release tablet [Pharmacy Med Name: Metoprolol Succinate ER Oral Tablet Extended Release 24 Hour 25 MG] 90 tablet 3     Sig: TAKE 1 TABLET EVERY DAY       Last Filled:      Patient Phone Number: [phone] (home)     Last appt: 6/12/2024   Next appt: Visit date not found  No future appointments.

## 2025-04-18 ENCOUNTER — HOSPITAL ENCOUNTER (OUTPATIENT)
Dept: WOMENS IMAGING | Age: 85
Discharge: HOME | End: 2025-04-18
Payer: MEDICARE

## 2025-04-18 ENCOUNTER — RESULTS FOLLOW-UP (OUTPATIENT)
Dept: INTERNAL MEDICINE CLINIC | Age: 85
End: 2025-04-18

## 2025-04-18 VITALS — HEIGHT: 64 IN | BODY MASS INDEX: 28.68 KG/M2 | WEIGHT: 168 LBS

## 2025-04-18 DIAGNOSIS — Z12.31 BREAST CANCER SCREENING BY MAMMOGRAM: ICD-10-CM

## 2025-04-18 PROCEDURE — 77063 BREAST TOMOSYNTHESIS BI: CPT

## 2025-05-11 DIAGNOSIS — I10 ESSENTIAL HYPERTENSION, BENIGN: ICD-10-CM

## 2025-05-12 RX ORDER — LOSARTAN POTASSIUM AND HYDROCHLOROTHIAZIDE 12.5; 5 MG/1; MG/1
0.5 TABLET ORAL DAILY
Qty: 45 TABLET | Refills: 1 | Status: SHIPPED | OUTPATIENT
Start: 2025-05-12

## 2025-05-12 SDOH — ECONOMIC STABILITY: FOOD INSECURITY: WITHIN THE PAST 12 MONTHS, YOU WORRIED THAT YOUR FOOD WOULD RUN OUT BEFORE YOU GOT MONEY TO BUY MORE.: NEVER TRUE

## 2025-05-12 SDOH — ECONOMIC STABILITY: FOOD INSECURITY: WITHIN THE PAST 12 MONTHS, THE FOOD YOU BOUGHT JUST DIDN'T LAST AND YOU DIDN'T HAVE MONEY TO GET MORE.: NEVER TRUE

## 2025-05-12 SDOH — ECONOMIC STABILITY: INCOME INSECURITY: IN THE LAST 12 MONTHS, WAS THERE A TIME WHEN YOU WERE NOT ABLE TO PAY THE MORTGAGE OR RENT ON TIME?: NO

## 2025-05-12 SDOH — ECONOMIC STABILITY: TRANSPORTATION INSECURITY
IN THE PAST 12 MONTHS, HAS THE LACK OF TRANSPORTATION KEPT YOU FROM MEDICAL APPOINTMENTS OR FROM GETTING MEDICATIONS?: NO

## 2025-05-12 ASSESSMENT — PATIENT HEALTH QUESTIONNAIRE - PHQ9
2. FEELING DOWN, DEPRESSED OR HOPELESS: NOT AT ALL
SUM OF ALL RESPONSES TO PHQ9 QUESTIONS 1 & 2: 0
SUM OF ALL RESPONSES TO PHQ QUESTIONS 1-9: 0
2. FEELING DOWN, DEPRESSED OR HOPELESS: NOT AT ALL
SUM OF ALL RESPONSES TO PHQ QUESTIONS 1-9: 0
1. LITTLE INTEREST OR PLEASURE IN DOING THINGS: NOT AT ALL
SUM OF ALL RESPONSES TO PHQ QUESTIONS 1-9: 0
1. LITTLE INTEREST OR PLEASURE IN DOING THINGS: NOT AT ALL
SUM OF ALL RESPONSES TO PHQ QUESTIONS 1-9: 0

## 2025-05-12 NOTE — TELEPHONE ENCOUNTER
Future Appointments   Date Time Provider Department Center   5/14/2025 10:00 AM Giancarlo Green MD [facility] DEP

## 2025-05-13 ASSESSMENT — ENCOUNTER SYMPTOMS
GASTROINTESTINAL NEGATIVE: 1
ALLERGIC/IMMUNOLOGIC NEGATIVE: 1
SHORTNESS OF BREATH: 0
EYES NEGATIVE: 1
EYE DISCHARGE: 0
RESPIRATORY NEGATIVE: 1
ABDOMINAL PAIN: 0

## 2025-05-13 NOTE — PROGRESS NOTES
transcription, some errors in transcription may have occurred.      An electronic signature was used to authenticate this note.    --Giancarlo Green MD

## 2025-05-14 ENCOUNTER — OFFICE VISIT (OUTPATIENT)
Dept: INTERNAL MEDICINE CLINIC | Age: 85
End: 2025-05-14

## 2025-05-14 VITALS
WEIGHT: 166 LBS | OXYGEN SATURATION: 95 % | DIASTOLIC BLOOD PRESSURE: 68 MMHG | HEART RATE: 57 BPM | SYSTOLIC BLOOD PRESSURE: 132 MMHG | BODY MASS INDEX: 28.49 KG/M2

## 2025-05-14 DIAGNOSIS — E78.2 MIXED HYPERLIPIDEMIA: ICD-10-CM

## 2025-05-14 DIAGNOSIS — I10 ESSENTIAL HYPERTENSION, BENIGN: Primary | ICD-10-CM

## 2025-05-14 DIAGNOSIS — R73.03 PREDIABETES: ICD-10-CM

## 2025-05-14 DIAGNOSIS — I65.29 CAROTID ATHEROSCLEROSIS, UNSPECIFIED LATERALITY: ICD-10-CM

## 2025-05-14 DIAGNOSIS — I48.0 PAROXYSMAL ATRIAL FIBRILLATION (HCC): ICD-10-CM

## 2025-05-14 LAB — HBA1C MFR BLD: 6.5 %

## 2025-05-14 RX ORDER — ROSUVASTATIN CALCIUM 10 MG/1
10 TABLET, COATED ORAL NIGHTLY
Qty: 90 TABLET | Refills: 0 | Status: SHIPPED | OUTPATIENT
Start: 2025-05-14

## 2025-08-26 ENCOUNTER — ANESTHESIA EVENT (OUTPATIENT)
Dept: OPERATING ROOM | Age: 85
End: 2025-08-26
Payer: MEDICARE

## 2025-08-27 ENCOUNTER — HOSPITAL ENCOUNTER (OUTPATIENT)
Age: 85
Setting detail: OUTPATIENT SURGERY
Discharge: HOME | End: 2025-08-27
Attending: FAMILY MEDICINE | Admitting: FAMILY MEDICINE
Payer: MEDICARE

## 2025-08-27 ENCOUNTER — ANESTHESIA (OUTPATIENT)
Dept: OPERATING ROOM | Age: 85
End: 2025-08-27
Payer: MEDICARE

## 2025-08-27 VITALS
BODY MASS INDEX: 28.15 KG/M2 | HEIGHT: 64 IN | HEART RATE: 128 BPM | OXYGEN SATURATION: 98 % | WEIGHT: 164.9 LBS | RESPIRATION RATE: 16 BRPM | TEMPERATURE: 98.1 F | DIASTOLIC BLOOD PRESSURE: 104 MMHG | SYSTOLIC BLOOD PRESSURE: 148 MMHG

## 2025-08-27 DIAGNOSIS — G89.18 POST-OPERATIVE PAIN: Primary | ICD-10-CM

## 2025-08-27 DIAGNOSIS — D48.5 NEOPLASM OF UNCERTAIN BEHAVIOR OF SKIN: ICD-10-CM

## 2025-08-27 PROCEDURE — 2500000003 HC RX 250 WO HCPCS: Performed by: FAMILY MEDICINE

## 2025-08-27 PROCEDURE — 3600000002 HC SURGERY LEVEL 2 BASE: Performed by: FAMILY MEDICINE

## 2025-08-27 PROCEDURE — 3700000001 HC ADD 15 MINUTES (ANESTHESIA): Performed by: FAMILY MEDICINE

## 2025-08-27 PROCEDURE — 6370000000 HC RX 637 (ALT 250 FOR IP): Performed by: FAMILY MEDICINE

## 2025-08-27 PROCEDURE — 3700000000 HC ANESTHESIA ATTENDED CARE: Performed by: FAMILY MEDICINE

## 2025-08-27 PROCEDURE — 7100000010 HC PHASE II RECOVERY - FIRST 15 MIN: Performed by: FAMILY MEDICINE

## 2025-08-27 PROCEDURE — 2709999900 HC NON-CHARGEABLE SUPPLY: Performed by: FAMILY MEDICINE

## 2025-08-27 PROCEDURE — 88305 TISSUE EXAM BY PATHOLOGIST: CPT

## 2025-08-27 PROCEDURE — 6360000002 HC RX W HCPCS: Performed by: FAMILY MEDICINE

## 2025-08-27 PROCEDURE — 2580000003 HC RX 258: Performed by: ANESTHESIOLOGY

## 2025-08-27 PROCEDURE — 7100000000 HC PACU RECOVERY - FIRST 15 MIN: Performed by: FAMILY MEDICINE

## 2025-08-27 PROCEDURE — 6360000002 HC RX W HCPCS

## 2025-08-27 PROCEDURE — 2580000003 HC RX 258: Performed by: FAMILY MEDICINE

## 2025-08-27 PROCEDURE — 7100000011 HC PHASE II RECOVERY - ADDTL 15 MIN: Performed by: FAMILY MEDICINE

## 2025-08-27 PROCEDURE — 7100000001 HC PACU RECOVERY - ADDTL 15 MIN: Performed by: FAMILY MEDICINE

## 2025-08-27 PROCEDURE — 3600000012 HC SURGERY LEVEL 2 ADDTL 15MIN: Performed by: FAMILY MEDICINE

## 2025-08-27 RX ORDER — ACETAMINOPHEN 325 MG/1
650 TABLET ORAL
Status: DISCONTINUED | OUTPATIENT
Start: 2025-08-27 | End: 2025-08-27 | Stop reason: HOSPADM

## 2025-08-27 RX ORDER — ACETAMINOPHEN AND CODEINE PHOSPHATE 300; 30 MG/1; MG/1
1 TABLET ORAL EVERY 6 HOURS PRN
Qty: 5 TABLET | Refills: 0 | Status: SHIPPED | OUTPATIENT
Start: 2025-08-27 | End: 2025-08-29

## 2025-08-27 RX ORDER — ONDANSETRON 2 MG/ML
4 INJECTION INTRAMUSCULAR; INTRAVENOUS
Status: DISCONTINUED | OUTPATIENT
Start: 2025-08-27 | End: 2025-08-27 | Stop reason: HOSPADM

## 2025-08-27 RX ORDER — BACITRACIN ZINC AND POLYMYXIN B SULFATE 500; 1000 [USP'U]/G; [USP'U]/G
OINTMENT TOPICAL
Status: DISCONTINUED | OUTPATIENT
Start: 2025-08-27 | End: 2025-08-27 | Stop reason: HOSPADM

## 2025-08-27 RX ORDER — IBUPROFEN 400 MG/1
400 TABLET, FILM COATED ORAL ONCE
Status: DISCONTINUED | OUTPATIENT
Start: 2025-08-27 | End: 2025-08-27 | Stop reason: HOSPADM

## 2025-08-27 RX ORDER — OXYCODONE HYDROCHLORIDE 5 MG/1
5 TABLET ORAL PRN
Status: DISCONTINUED | OUTPATIENT
Start: 2025-08-27 | End: 2025-08-27 | Stop reason: HOSPADM

## 2025-08-27 RX ORDER — SODIUM CHLORIDE 0.9 % (FLUSH) 0.9 %
5-40 SYRINGE (ML) INJECTION PRN
Status: DISCONTINUED | OUTPATIENT
Start: 2025-08-27 | End: 2025-08-27 | Stop reason: HOSPADM

## 2025-08-27 RX ORDER — SODIUM CHLORIDE 0.9 % (FLUSH) 0.9 %
5-40 SYRINGE (ML) INJECTION EVERY 12 HOURS SCHEDULED
Status: DISCONTINUED | OUTPATIENT
Start: 2025-08-27 | End: 2025-08-27 | Stop reason: HOSPADM

## 2025-08-27 RX ORDER — FENTANYL CITRATE 50 UG/ML
INJECTION, SOLUTION INTRAMUSCULAR; INTRAVENOUS
Status: DISCONTINUED | OUTPATIENT
Start: 2025-08-27 | End: 2025-08-27 | Stop reason: SDUPTHER

## 2025-08-27 RX ORDER — OXYCODONE HYDROCHLORIDE 10 MG/1
10 TABLET ORAL PRN
Status: DISCONTINUED | OUTPATIENT
Start: 2025-08-27 | End: 2025-08-27 | Stop reason: HOSPADM

## 2025-08-27 RX ORDER — FENTANYL CITRATE 50 UG/ML
50 INJECTION, SOLUTION INTRAMUSCULAR; INTRAVENOUS EVERY 5 MIN PRN
Status: DISCONTINUED | OUTPATIENT
Start: 2025-08-27 | End: 2025-08-27 | Stop reason: HOSPADM

## 2025-08-27 RX ORDER — LIDOCAINE HYDROCHLORIDE 20 MG/ML
INJECTION, SOLUTION EPIDURAL; INFILTRATION; INTRACAUDAL; PERINEURAL
Status: DISCONTINUED | OUTPATIENT
Start: 2025-08-27 | End: 2025-08-27 | Stop reason: SDUPTHER

## 2025-08-27 RX ORDER — SODIUM CHLORIDE 9 MG/ML
INJECTION, SOLUTION INTRAVENOUS PRN
Status: DISCONTINUED | OUTPATIENT
Start: 2025-08-27 | End: 2025-08-27 | Stop reason: HOSPADM

## 2025-08-27 RX ORDER — METOPROLOL TARTRATE 1 MG/ML
INJECTION, SOLUTION INTRAVENOUS
Status: DISCONTINUED | OUTPATIENT
Start: 2025-08-27 | End: 2025-08-27 | Stop reason: SDUPTHER

## 2025-08-27 RX ORDER — MAGNESIUM HYDROXIDE 1200 MG/15ML
LIQUID ORAL CONTINUOUS PRN
Status: DISCONTINUED | OUTPATIENT
Start: 2025-08-27 | End: 2025-08-27 | Stop reason: HOSPADM

## 2025-08-27 RX ORDER — ACETAMINOPHEN 500 MG
1000 TABLET ORAL ONCE
Status: COMPLETED | OUTPATIENT
Start: 2025-08-27 | End: 2025-08-27

## 2025-08-27 RX ORDER — PROPOFOL 10 MG/ML
INJECTION, EMULSION INTRAVENOUS
Status: DISCONTINUED | OUTPATIENT
Start: 2025-08-27 | End: 2025-08-27 | Stop reason: SDUPTHER

## 2025-08-27 RX ADMIN — METOPROLOL TARTRATE 2.5 MG: 5 INJECTION, SOLUTION INTRAVENOUS at 12:50

## 2025-08-27 RX ADMIN — PROPOFOL 125 MCG/KG/MIN: 10 INJECTION, EMULSION INTRAVENOUS at 12:24

## 2025-08-27 RX ADMIN — PROPOFOL 30 MG: 10 INJECTION, EMULSION INTRAVENOUS at 12:25

## 2025-08-27 RX ADMIN — LIDOCAINE HYDROCHLORIDE 50 MG: 20 INJECTION, SOLUTION EPIDURAL; INFILTRATION; INTRACAUDAL; PERINEURAL at 12:24

## 2025-08-27 RX ADMIN — FENTANYL CITRATE 25 MCG: 50 INJECTION INTRAMUSCULAR; INTRAVENOUS at 12:22

## 2025-08-27 RX ADMIN — SODIUM CHLORIDE: 0.9 INJECTION, SOLUTION INTRAVENOUS at 11:16

## 2025-08-27 RX ADMIN — METOPROLOL TARTRATE 5 MG: 5 INJECTION, SOLUTION INTRAVENOUS at 12:30

## 2025-08-27 RX ADMIN — ACETAMINOPHEN 1000 MG: 500 TABLET ORAL at 11:17

## 2025-08-27 ASSESSMENT — PAIN - FUNCTIONAL ASSESSMENT
PAIN_FUNCTIONAL_ASSESSMENT: 0-10

## 2025-08-27 ASSESSMENT — PAIN SCALES - GENERAL
PAINLEVEL_OUTOF10: 0
PAINLEVEL_OUTOF10: 0

## 2025-09-06 ENCOUNTER — OFFICE VISIT (OUTPATIENT)
Age: 85
End: 2025-09-06

## 2025-09-06 VITALS
HEIGHT: 64 IN | BODY MASS INDEX: 28 KG/M2 | OXYGEN SATURATION: 97 % | SYSTOLIC BLOOD PRESSURE: 178 MMHG | TEMPERATURE: 97.7 F | DIASTOLIC BLOOD PRESSURE: 88 MMHG | WEIGHT: 164 LBS | HEART RATE: 57 BPM

## 2025-09-06 DIAGNOSIS — S92.002A CLOSED NONDISPLACED FRACTURE OF LEFT CALCANEUS, UNSPECIFIED PORTION OF CALCANEUS, INITIAL ENCOUNTER: Primary | ICD-10-CM

## 2025-09-06 DIAGNOSIS — R03.0 ELEVATED BLOOD PRESSURE READING: ICD-10-CM

## 2025-09-06 DIAGNOSIS — S93.602A FOOT SPRAIN, LEFT, INITIAL ENCOUNTER: ICD-10-CM

## (undated) DEVICE — 1010 S-DRAPE TOWEL DRAPE 10/BX: Brand: STERI-DRAPE™

## (undated) DEVICE — DRAPE HND W114XL142IN BLU POLYPR W O PCH FEN CRD AND TB HLDR

## (undated) DEVICE — SYRINGE MED 10ML LUERLOCK TIP W/O SFTY DISP

## (undated) DEVICE — NEEDLE HYPO 18GA L1.5IN THN WALL PIVOTING SHLD BVL ORIENTED

## (undated) DEVICE — MERCY HEALTH WEST TURNOVER: Brand: MEDLINE INDUSTRIES, INC.

## (undated) DEVICE — ELECTRODE PT RET AD L9FT HI MOIST COND ADH HYDRGEL CORDED

## (undated) DEVICE — Z DISCONTINUED BY MEDLINE USE 2711682 TRAY SKIN PREP DRY W/ PREM GLV

## (undated) DEVICE — DECANTER FLD L3IN WHT FOR VI TO VI TRNSF

## (undated) DEVICE — MINOR SET UP PK

## (undated) DEVICE — GLOVE SURG SZ 75 CRM LTX FREE POLYISOPRENE POLYMER BEAD ANTI

## (undated) DEVICE — Device

## (undated) DEVICE — BLADE SURG NO15 12MM S STL REUSE HNDL CONVENTIONAL SHRP TIP

## (undated) DEVICE — GOWN SURG L L45IN BLU SMS NONREINFORCED VELC AND TIE 3 LEV

## (undated) DEVICE — SUTURE PROL SZ 6-0 L18IN NONABSORBABLE BLU P-3 L13MM 3/8 8695G

## (undated) DEVICE — DRAPE SURG L W23XL17IN CLR POLYETH TRNSPAR TWL STERI-DRP

## (undated) DEVICE — SOLUTION SCRB 4OZ 7.5% POVIDONE IOD ANTIMIC BTL

## (undated) DEVICE — NEEDLE MBO SAFETY MONOJECT 18GX1-1/2

## (undated) DEVICE — SUTURE MONOCRYL + SZ 5 0 L18IN ABSRB UD L13MM P 3 3 8 CIR PRIM MCP493G

## (undated) DEVICE — SOLUTION IV IRRIG POUR BRL 0.9% SODIUM CHL 2F7124

## (undated) DEVICE — SUTURE MCRYL SZ 5-0 L18IN ABSRB UD L13MM P-3 3/8 CIR PRIM Y493G

## (undated) DEVICE — TUBING, SUCTION, 1/4" X 12', STRAIGHT: Brand: MEDLINE

## (undated) DEVICE — SHEET,DRAPE,53X77,STERILE: Brand: MEDLINE

## (undated) DEVICE — SUTURE PROL SZ 4-0 L18IN NONABSORBABLE BLU L13MM P-3 3/8 8699G

## (undated) DEVICE — NEEDLE HYPO 25GA L1.5IN BVL ORIENTED ECLIPSE

## (undated) DEVICE — DRAPE SURG W53XL77IN STD SMS POLYPR 3 QTR ABSRB REINF W/O

## (undated) DEVICE — INTENDED FOR TISSUE SEPARATION, AND OTHER PROCEDURES THAT REQUIRE A SHARP SURGICAL BLADE TO PUNCTURE OR CUT.: Brand: BARD-PARKER ® STAINLESS STEEL BLADES

## (undated) DEVICE — GOWN SIRUS NONREIN LG W/TWL: Brand: MEDLINE INDUSTRIES, INC.

## (undated) DEVICE — CANISTER, RIGID, 1200CC: Brand: MEDLINE INDUSTRIES, INC.

## (undated) DEVICE — SOLUTION PREP PAINT POV IOD FOR SKIN MUCOUS MEM

## (undated) DEVICE — COVER LT HNDL BLU PLAS

## (undated) DEVICE — CHLORAPREP 26ML ORANGE